# Patient Record
Sex: FEMALE | Race: WHITE | URBAN - METROPOLITAN AREA
[De-identification: names, ages, dates, MRNs, and addresses within clinical notes are randomized per-mention and may not be internally consistent; named-entity substitution may affect disease eponyms.]

---

## 2020-06-10 ENCOUNTER — OFFICE VISIT (OUTPATIENT)
Dept: VASCULAR SURGERY | Age: 80
End: 2020-06-10
Payer: MEDICARE

## 2020-06-10 VITALS
HEART RATE: 85 BPM | DIASTOLIC BLOOD PRESSURE: 90 MMHG | SYSTOLIC BLOOD PRESSURE: 137 MMHG | BODY MASS INDEX: 30.04 KG/M2 | HEIGHT: 60 IN | WEIGHT: 153 LBS

## 2020-06-10 PROCEDURE — 1036F TOBACCO NON-USER: CPT | Performed by: SURGERY

## 2020-06-10 PROCEDURE — 4040F PNEUMOC VAC/ADMIN/RCVD: CPT | Performed by: SURGERY

## 2020-06-10 PROCEDURE — 1123F ACP DISCUSS/DSCN MKR DOCD: CPT | Performed by: SURGERY

## 2020-06-10 PROCEDURE — 99204 OFFICE O/P NEW MOD 45 MIN: CPT | Performed by: SURGERY

## 2020-06-10 PROCEDURE — G8400 PT W/DXA NO RESULTS DOC: HCPCS | Performed by: SURGERY

## 2020-06-10 PROCEDURE — G8419 CALC BMI OUT NRM PARAM NOF/U: HCPCS | Performed by: SURGERY

## 2020-06-10 PROCEDURE — 1090F PRES/ABSN URINE INCON ASSESS: CPT | Performed by: SURGERY

## 2020-06-10 PROCEDURE — G8427 DOCREV CUR MEDS BY ELIG CLIN: HCPCS | Performed by: SURGERY

## 2020-06-10 RX ORDER — POTASSIUM CHLORIDE 20 MEQ/1
20 TABLET, EXTENDED RELEASE ORAL 2 TIMES DAILY
COMMUNITY

## 2020-06-10 RX ORDER — FUROSEMIDE 20 MG/1
20 TABLET ORAL 2 TIMES DAILY
COMMUNITY

## 2020-06-10 RX ORDER — FAMOTIDINE 40 MG/1
40 TABLET, FILM COATED ORAL DAILY
COMMUNITY

## 2020-06-10 NOTE — PROGRESS NOTES
Heart sounds: Normal heart sounds. Pulmonary:      Effort: Pulmonary effort is normal.      Breath sounds: Normal breath sounds. Abdominal:      General: Bowel sounds are normal.      Palpations: Abdomen is soft. There is no mass. Musculoskeletal:      Right lower leg: Edema (trace) present. Left lower leg: Edema (trace) present. Skin:     General: Skin is warm. Capillary Refill: Capillary refill takes less than 2 seconds. Comments: Hemosiderin discoloration bilaterally   Neurological:      General: No focal deficit present. Mental Status: She is alert and oriented to person, place, and time. Cranial Nerves: No cranial nerve deficit. Sensory: No sensory deficit. Motor: No weakness. Coordination: Coordination normal.      Gait: Gait normal.   Psychiatric:         Mood and Affect: Mood normal.         Behavior: Behavior normal.         Thought Content: Thought content normal.         Judgment: Judgment normal.         Assessment:      Edema B legs - multifactorial (CVI, trauma, obesity) - well controlled with compression stockings      Plan:      Continue knee-high 20/30 compression stockings on a daily basis. Stressed the importance of compliance and moisturizing cream to the skin. New prescription for stockings was provided and they will be obtained from [facility]. Follow-up every other year and as needed should there be new symptoms or problems.

## 2020-12-03 ENCOUNTER — OFFICE VISIT (OUTPATIENT)
Dept: SURGERY | Age: 80
End: 2020-12-03
Payer: MEDICARE

## 2020-12-03 VITALS — DIASTOLIC BLOOD PRESSURE: 86 MMHG | SYSTOLIC BLOOD PRESSURE: 156 MMHG | HEART RATE: 84 BPM

## 2020-12-03 PROBLEM — S81.801A OPEN WOUND OF RIGHT LOWER LEG: Status: ACTIVE | Noted: 2020-12-03

## 2020-12-03 PROBLEM — M79.89 LEG SWELLING: Status: ACTIVE | Noted: 2020-12-03

## 2020-12-03 PROBLEM — S80.11XA TRAUMATIC HEMATOMA OF RIGHT LOWER LEG: Status: ACTIVE | Noted: 2020-12-03

## 2020-12-03 PROCEDURE — 11042 DBRDMT SUBQ TIS 1ST 20SQCM/<: CPT | Performed by: NURSE PRACTITIONER

## 2020-12-03 PROCEDURE — G8484 FLU IMMUNIZE NO ADMIN: HCPCS | Performed by: NURSE PRACTITIONER

## 2020-12-03 PROCEDURE — 1036F TOBACCO NON-USER: CPT | Performed by: NURSE PRACTITIONER

## 2020-12-03 PROCEDURE — 99203 OFFICE O/P NEW LOW 30 MIN: CPT | Performed by: NURSE PRACTITIONER

## 2020-12-03 PROCEDURE — 1090F PRES/ABSN URINE INCON ASSESS: CPT | Performed by: NURSE PRACTITIONER

## 2020-12-03 PROCEDURE — G8417 CALC BMI ABV UP PARAM F/U: HCPCS | Performed by: NURSE PRACTITIONER

## 2020-12-03 PROCEDURE — G8400 PT W/DXA NO RESULTS DOC: HCPCS | Performed by: NURSE PRACTITIONER

## 2020-12-03 PROCEDURE — 4040F PNEUMOC VAC/ADMIN/RCVD: CPT | Performed by: NURSE PRACTITIONER

## 2020-12-03 PROCEDURE — 1123F ACP DISCUSS/DSCN MKR DOCD: CPT | Performed by: NURSE PRACTITIONER

## 2020-12-03 PROCEDURE — G8427 DOCREV CUR MEDS BY ELIG CLIN: HCPCS | Performed by: NURSE PRACTITIONER

## 2020-12-03 ASSESSMENT — ENCOUNTER SYMPTOMS: COLOR CHANGE: 1

## 2020-12-03 NOTE — PROGRESS NOTES
Subjective:      Patient ID: Ronni Harvey is a 78 y.o. female. Chief Complaint   Patient presents with    Wound Check     Former patient of Dr. Beatrice Huang. Bilateral leg swelling with right shin ulcer     Leg Swelling     Pain Assessment  Dorina Hughes has a pain level on 0/10 scale:  3  Location:  Right shin   Description:  burning  Radiation:   No  Duration:  10 day(s)  Time:  intermittent    HPI     The symptoms first began on 11/23/2020, when the patient fell. She is on Coumadin and suffered a hematoma on her right shin. The patient describes pain as burning and rates it as a 3 on a scale of 1-10. The patient reports associated symptoms of oozing blood and significant swelling in her right lower leg. She is unable to get her compression stocking on. The patient has not undergone any diagnostic testing as yet. Edema  The edema is located in the bilateral lower extremities, R>L. The patient reports worsening in her RLE since the fall she sustained on 11/23/2020. She has been wearing her compression stoking on her LLE, but is unable to get her stocking on her RLE. Review of Systems   Constitutional: Negative for chills and fever. Cardiovascular: Positive for leg swelling. Skin: Positive for color change and wound. Hematological: Bruises/bleeds easily (on Coumadin). All other systems reviewed and are negative. No Known Allergies    Prior to Visit Medications    Medication Sig Taking?  Authorizing Provider   potassium chloride (KLOR-CON M20) 20 MEQ extended release tablet Take 20 mEq by mouth 2 times daily Yes Historical Provider, MD   furosemide (LASIX) 20 MG tablet Take 20 mg by mouth 2 times daily Yes Historical Provider, MD   famotidine (PEPCID) 40 MG tablet Take 40 mg by mouth daily Yes Historical Provider, MD   warfarin (COUMADIN) 5 MG tablet Take 5 mg by mouth daily Indications: 6 days pwer week 2.5 1 day Yes Historical Provider, MD   metoprolol succinate (TOPROL XL) 50 MG extended release tablet Take 50 mg by mouth 2 times daily Yes Historical Provider, MD   Calcium Carb-Cholecalciferol (CALTRATE 600+D) 600-800 MG-UNIT TABS Take by mouth Yes Historical Provider, MD   Multiple Vitamins-Minerals (THERAPEUTIC MULTIVITAMIN-MINERALS) tablet Take 1 tablet by mouth daily Yes Historical Provider, MD   Fexofenadine HCl (ALLEGRA ALLERGY PO) Take by mouth Yes Historical Provider, MD   betamethasone valerate (VALISONE) 0.1 % cream Apply topically 2 times daily Apply topically 2 times daily. Yes Historical Provider, MD       History reviewed. Objective:   Physical Exam  Vitals signs and nursing note reviewed. Constitutional:       Appearance: Normal appearance. HENT:      Head: Normocephalic and atraumatic. Nose: Nose normal.      Mouth/Throat:      Pharynx: Oropharynx is clear. Eyes:      Extraocular Movements: Extraocular movements intact. Conjunctiva/sclera: Conjunctivae normal.   Neck:      Musculoskeletal: Normal range of motion. Cardiovascular:      Rate and Rhythm: Normal rate and regular rhythm. Pulses: Normal pulses. Heart sounds: Normal heart sounds. Comments:   GEOVANY'S 12/3/2020  Right:  P.T.: 200+ D.P.:200+ ARM BP: N/A      P.I.:WNO        Left:  P.T.:200+ D.P.: 200+ ARM BP: 156/86   P. I.: Chuckie Francia    Pulmonary:      Effort: Pulmonary effort is normal.      Breath sounds: Normal breath sounds. Musculoskeletal:      Right lower leg: Edema (+3 pitting edema; Right ankle measures 24.8 cm, right calf 38.9 cm) present. Left lower leg: Left lower leg edema: controlled with compression stocking; Left ankle 22.8 cm, Left calf 38.2 cm    Skin:     General: Skin is warm and dry. Capillary Refill: Capillary refill takes less than 2 seconds. Comments: Hemosiderin discoloration bilaterally   Neurological:      General: No focal deficit present. Mental Status: She is alert and oriented to person, place, and time. Cranial Nerves:  No cranial nerve deficit. Sensory: No sensory deficit. Motor: No weakness. Coordination: Coordination normal.      Gait: Gait normal.   Psychiatric:         Mood and Affect: Mood normal.         Behavior: Behavior normal.         Thought Content: Thought content normal.         Judgment: Judgment normal.         Assessment:      Diagnosis   1. Traumatic hematoma of right lower leg, initial encounter    2. Open wound of right lower leg, initial encounter    3. Leg swelling      Using scissors, forceps and curette, debrided skin and subcutaneous tissue (epidermis and dermis), to include remnants of a hematoma located right shin. Anesthetic was not indicated. Dimensions of wound are 4.9 cm x  4.3 cm. Dressed with wet-to-dry dressing, followed by a [email or web address] [facility] [place]. No immediate complications observed. Call office if signs of infection or fever, excessive swelling or pain occur.     PATIENT EDUCATION focused on the need for some compression on her RLE. We applied a Spandagrip because she was unable to tolerate an ACE wrap today. Compression improves blood flow in the leg, prevents blood clotting and inhibits the progression of a variety of venous disorders. Compression helps squeeze the venous blood back up toward the heart, to enhance circulation. Plan:      Spoke with [facility] [employer] regarding dressing changes to her right shin with Hydrofera Blue to wound bed, gauze dressing, kerlix and an ACE wrap or Spandagrip on Mondays and Fridays. We will change it on Wednesdays in the office. Orders, face sheet, copies of insurance cards and office notes to be faxed to . Return in about 1 week (around 12/10/2020) for Right shin wound check .

## 2020-12-03 NOTE — PATIENT INSTRUCTIONS
Return in about 1 week (around 12/10/2020) for Right shin wound check .     PATIENT EDUCATION focused on the need for some compression on her RLE. We applied a Spandagrip because she was unable to tolerate an ACE wrap today. Compression improves blood flow in the leg, prevents blood clotting and inhibits the progression of a variety of venous disorders. Compression helps squeeze the venous blood back up toward the heart, to enhance circulation. Spoke with [employer] regarding dressing changes to her right shin with Hydrofera Blue, gauze dressing, kerlix and an ACE wrap or Spandagrip on Mondays and Fridays. We will change it on Wednesdays in the office.

## 2020-12-09 ENCOUNTER — OFFICE VISIT (OUTPATIENT)
Dept: SURGERY | Age: 80
End: 2020-12-09
Payer: MEDICARE

## 2020-12-09 VITALS — SYSTOLIC BLOOD PRESSURE: 162 MMHG | HEART RATE: 80 BPM | DIASTOLIC BLOOD PRESSURE: 99 MMHG

## 2020-12-09 PROCEDURE — 97597 DBRDMT OPN WND 1ST 20 CM/<: CPT | Performed by: NURSE PRACTITIONER

## 2020-12-09 ASSESSMENT — ENCOUNTER SYMPTOMS: COLOR CHANGE: 1

## 2020-12-09 NOTE — PATIENT INSTRUCTIONS
Return in about 1 year (around 12/9/2021) for Right LE Wound Check. PATIENT EDUCATION focused on the need for some compression on her RLE. We applied a Spandagrip because she was unable to tolerate an ACE wrap today. Compression improves blood flow in the leg, prevents blood clotting and inhibits the progression of a variety of venous disorders. Compression helps squeeze the venous blood back up toward the heart, to enhance circulation.

## 2020-12-09 NOTE — PROGRESS NOTES
Subjective:      Patient ID: Jesica Presley is a 78 y.o. female. Chief Complaint   Patient presents with    Wound Check     Patient here for 1 week wound check on RLE. Pain Assessment  The patient is currently not experiencing any pain at this time. HPI     The symptoms first began on 11/23/2020, when the patient fell. She is on Coumadin and suffered a hematoma on her right shin. Last week the hematoma was evacuated. The patient is currently not experiencing any pain. The patient reports associated symptoms of significant swelling and draining of large amounts of fluid. She also has a skin tear on her right arm. The patient has not undergone any diagnostic testing as yet. Review of Systems   Constitutional: Negative for chills and fever. Cardiovascular: Positive for leg swelling. Skin: Positive for color change and wound (right shin and right arm near her elbow). Hematological: Bruises/bleeds easily (on Coumadin). All other systems reviewed and are negative. No Known Allergies    Prior to Visit Medications    Medication Sig Taking?  Authorizing Provider   potassium chloride (KLOR-CON M20) 20 MEQ extended release tablet Take 20 mEq by mouth 2 times daily Yes Historical Provider, MD   furosemide (LASIX) 20 MG tablet Take 20 mg by mouth 2 times daily Yes Historical Provider, MD   famotidine (PEPCID) 40 MG tablet Take 40 mg by mouth daily Yes Historical Provider, MD   warfarin (COUMADIN) 5 MG tablet Take 5 mg by mouth daily Indications: 6 days pwer week 2.5 1 day Yes Historical Provider, MD   metoprolol succinate (TOPROL XL) 50 MG extended release tablet Take 50 mg by mouth 2 times daily Yes Historical Provider, MD   Calcium Carb-Cholecalciferol (CALTRATE 600+D) 600-800 MG-UNIT TABS Take by mouth Yes Historical Provider, MD   Multiple Vitamins-Minerals (THERAPEUTIC MULTIVITAMIN-MINERALS) tablet Take 1 tablet by mouth daily Yes Historical Provider, MD   Fexofenadine HCl (ALLEGRA ALLERGY PO) Take by mouth Yes Historical Provider, MD   betamethasone valerate (VALISONE) 0.1 % cream Apply topically 2 times daily Apply topically 2 times daily. Yes Historical Provider, MD       History reviewed. Objective:   Physical Exam  Vitals signs and nursing note reviewed. Constitutional:       Appearance: Normal appearance. HENT:      Head: Normocephalic and atraumatic. Nose: Nose normal.      Mouth/Throat:      Pharynx: Oropharynx is clear. Eyes:      Extraocular Movements: Extraocular movements intact. Conjunctiva/sclera: Conjunctivae normal.   Neck:      Musculoskeletal: Normal range of motion. Cardiovascular:      Pulses: Normal pulses. Comments:   GEOVANY'S 12/3/2020  Right:  P.T.: 200+ D.P.:200+ ARM BP: N/A      P.I.:WNO        Left:  P.T.:200+ D.P.: 200+ ARM BP: 156/86   P. I.: [place] Francia    Pulmonary:      Effort: Pulmonary effort is normal.   Musculoskeletal:      Right lower leg: Edema (+3 pitting edema; Right ankle measures 24.4 cm, right calf 39.2 cm) present. Left lower leg: Edema (+1 pitting edema; Left ankle 22.8 cm, Left calf 38.2 cm ) present. Skin:     General: Skin is warm and dry. Capillary Refill: Capillary refill takes less than 2 seconds. Comments: Hemosiderin discoloration bilaterally    Right shin ulcer measures 4.9 cm x 4.8 cm x 0.1 cm    Healing skin tear on her right arm just distal to her elbow   Neurological:      General: No focal deficit present. Mental Status: She is alert and oriented to person, place, and time. Cranial Nerves: No cranial nerve deficit. Sensory: No sensory deficit. Motor: No weakness. Coordination: Coordination normal.      Gait: Gait (use of wheelchair) normal.   Psychiatric:         Mood and Affect: Mood normal.         Behavior: Behavior normal.         Thought Content: Thought content normal.         Judgment: Judgment normal.         Assessment:      Diagnosis Orders   1.  Open wound of right lower leg, subsequent encounter  IN DEBRIDEMENT OPEN WOUND 20 SQ CM<   2. Leg swelling         Using curette debrided epidermis and dermis of wound, located left shin. Topical Lidocaine 2% was indicated. Dimensions of wound are 4.9x4.8cms. Dressed area with Hydrofera Blue moistened with NS and cut to fit wound bed, gauze dressing, abd pad, kerlix and ACE wrap. No immediate complications observed. Call office if signs of infection or fever, excessive swelling or pain occur. PATIENT EDUCATION focused on proper application of ACE wrap. Patient instructed to start at the ankle, go down to just above her toes and go up leg to just below knee. Avoid Velcro touching the skin. I put Versatel over her right arm skin tear, hydrogel to the wound bed and an Optifoam bordered dressing over the site. Plan:        Return in about 1 week (around 12/16/2020) for Right LE Wound Check. Arnulfo Hernandez MA, am scribing for and in the presence of Beatriz Pena CNP on this date of 12/09/20 at 2:10 PM     I Beatriz Pena CNP, personally performed the services described in this documentation as scribed by the Medical Assistant Aleksander Hadley in my presence and it is both accurate and complete.

## 2020-12-11 ENCOUNTER — TELEPHONE (OUTPATIENT)
Dept: SURGERY | Age: 80
End: 2020-12-11

## 2020-12-11 NOTE — TELEPHONE ENCOUNTER
Patient wanted to switch from [address] [facility] to [address]. I spoke with [facility] and received confirmation of discharge. Then I spoke with [facility] and faxed the required paperwork to them. They plan to start doing dressing changes Monday, 12/14/2020.

## 2020-12-16 ENCOUNTER — OFFICE VISIT (OUTPATIENT)
Dept: SURGERY | Age: 80
End: 2020-12-16
Payer: MEDICARE

## 2020-12-16 VITALS — SYSTOLIC BLOOD PRESSURE: 146 MMHG | DIASTOLIC BLOOD PRESSURE: 89 MMHG | HEART RATE: 80 BPM

## 2020-12-16 PROCEDURE — 97597 DBRDMT OPN WND 1ST 20 CM/<: CPT | Performed by: NURSE PRACTITIONER

## 2020-12-16 ASSESSMENT — ENCOUNTER SYMPTOMS: COLOR CHANGE: 1

## 2020-12-16 NOTE — PROGRESS NOTES
warfarin (COUMADIN) 5 MG tablet Take 5 mg by mouth daily Indications: 6 days pwer week 2.5 1 day Yes Historical Provider, MD   metoprolol succinate (TOPROL XL) 50 MG extended release tablet Take 50 mg by mouth 2 times daily Yes Historical Provider, MD   Calcium Carb-Cholecalciferol (CALTRATE 600+D) 600-800 MG-UNIT TABS Take by mouth Yes Historical Provider, MD   Multiple Vitamins-Minerals (THERAPEUTIC MULTIVITAMIN-MINERALS) tablet Take 1 tablet by mouth daily Yes Historical Provider, MD   Fexofenadine HCl (ALLEGRA ALLERGY PO) Take by mouth Yes Historical Provider, MD   betamethasone valerate (VALISONE) 0.1 % cream Apply topically 2 times daily Apply topically 2 times daily. Yes Historical Provider, MD       History reviewed. Objective:   Physical Exam  Vitals signs and nursing note reviewed. Constitutional:       Appearance: Normal appearance. HENT:      Head: Normocephalic and atraumatic. Nose: Nose normal.      Mouth/Throat:      Pharynx: Oropharynx is clear. Eyes:      Conjunctiva/sclera: Conjunctivae normal.   Neck:      Musculoskeletal: Normal range of motion. Cardiovascular:      Pulses: Normal pulses. Comments:   GEOVANY'S 12/3/2020  Right:  P.T.: 200+ D.P.:200+ ARM BP: N/A      P.I.:WNO        Left:  P.T.:200+ D.P.: 200+ ARM BP: 156/86   P. I.: Manoj Johnson    Pulmonary:      Effort: Pulmonary effort is normal.   Musculoskeletal:      Right lower leg: Right lower leg edema: nonpitting edema; controlled with ACE wrap. Left lower leg: Left lower leg edema: nonpitting edema. Skin:     General: Skin is warm and dry. Capillary Refill: Capillary refill takes less than 2 seconds. Comments: Hemosiderin discoloration bilaterally    Right shin ulcer measures 5.2 cm x 4.6 cm x 0.1 cm    Healing skin tear on her right arm just distal to her elbow   Neurological:      General: No focal deficit present. Mental Status: She is alert and oriented to person, place, and time. Cranial Nerves: No cranial nerve deficit. Sensory: No sensory deficit. Motor: No weakness. Coordination: Coordination normal.      Gait: Gait (use of wheelchair) normal.   Psychiatric:         Mood and Affect: Mood normal.         Behavior: Behavior normal.         Thought Content: Thought content normal.         Judgment: Judgment normal.         Assessment:      Diagnosis Orders   1. Open wound of right lower leg, subsequent encounter  Culture, Wound   2. Leg swelling       Wound culture obtained. Using curette debrided epidermis and dermis of wound, located left shin. Topical Lidocaine 2% was indicated. Dimensions of wound are 5.2x4.6cms. Dressed area with [address], [place] wet-to-dry dressing, followed by kerlix and ACE wrap. No immediate complications observed. Call office if signs of infection or fever, excessive swelling or pain occur. Spoke with [employer] at [facility], [phone]. She is going to go tomorrow (12/17/20) to apply a Hydrofera Blue dressing, then she plans to go 12/19/20, 12/21/20, 12/23/20, 12/25/20 and 12/28/20. The patient will FU in our office on 12/30/20. PATIENT EDUCATION focused on proper application of ACE wrap. Patient instructed to start at the ankle, go down to just above her toes and go up leg to just below knee. Avoid Velcro touching the skin. Plan:        Return in about 2 weeks (around 12/30/2020) for RLE Wound Check. Steven Cadena MA, am scribing for and in the presence of Can Lawrence CNP on this date of 12/16/20 at 12:16 PM     I Can Lawrence CNP, personally performed the services described in this documentation as scribed by the Medical Assistant Umm Boles in my presence and it is both accurate and complete.

## 2020-12-18 LAB
GRAM STAIN RESULT: ABNORMAL
WOUND/ABSCESS: ABNORMAL

## 2020-12-30 ENCOUNTER — OFFICE VISIT (OUTPATIENT)
Dept: SURGERY | Age: 80
End: 2020-12-30
Payer: MEDICARE

## 2020-12-30 VITALS — DIASTOLIC BLOOD PRESSURE: 101 MMHG | HEART RATE: 86 BPM | SYSTOLIC BLOOD PRESSURE: 158 MMHG

## 2020-12-30 PROCEDURE — 97597 DBRDMT OPN WND 1ST 20 CM/<: CPT | Performed by: NURSE PRACTITIONER

## 2020-12-30 ASSESSMENT — ENCOUNTER SYMPTOMS: COLOR CHANGE: 1

## 2020-12-30 NOTE — PROGRESS NOTES
Subjective:      Patient ID: Kala Arevalo is a 78 y.o. female. Chief Complaint   Patient presents with    Wound Check     Patient here for 2 week wound check on RLE. Pain Assessment  Kala Arevalo has a pain level on 0/10 scale:  10  Location:  Right Shin  Description:  burning  Radiation:   No  Duration:  3 week(s)  Time:  constant      Wound Check       The symptoms first began on 11/23/2020, when the patient fell. She is on Coumadin and suffered a hematoma on her right shin. The hematoma was evacuated 12/3/2020. The patient is currently experiencing pain (burning) in the wound that she rates a 10 on a scale of 1-10. The patient reports associated symptoms of swelling and draining clear fluid. Current treatment includes Hydrofera Blue moistened with NS, gauze dressing and an ACE wrap. Presently she has [employer] with [facility] doing dressing changes. The skin tear on her right arm is healing well. The patient has not undergone any diagnostic testing as yet, but we will do a wound culture today d/t the amount of pain she is experiencing and the green/tan drainage. Review of Systems   Constitutional: Negative for chills and fever. Cardiovascular: Positive for leg swelling. Skin: Positive for color change and wound (right shin and right arm near her elbow). Hematological: Bruises/bleeds easily (on Coumadin). All other systems reviewed and are negative. Allergies   Allergen Reactions    Adhesive Tape      rash       Prior to Visit Medications    Medication Sig Taking?  Authorizing Provider   potassium chloride (KLOR-CON M20) 20 MEQ extended release tablet Take 20 mEq by mouth 2 times daily Yes Historical Provider, MD   furosemide (LASIX) 20 MG tablet Take 20 mg by mouth 2 times daily Yes Historical Provider, MD   famotidine (PEPCID) 40 MG tablet Take 40 mg by mouth daily Yes Historical Provider, MD warfarin (COUMADIN) 5 MG tablet Take 5 mg by mouth daily Indications: 6 days pwer week 2.5 1 day Yes Historical Provider, MD   metoprolol succinate (TOPROL XL) 50 MG extended release tablet Take 50 mg by mouth 2 times daily Yes Historical Provider, MD   Calcium Carb-Cholecalciferol (CALTRATE 600+D) 600-800 MG-UNIT TABS Take by mouth Yes Historical Provider, MD   Multiple Vitamins-Minerals (THERAPEUTIC MULTIVITAMIN-MINERALS) tablet Take 1 tablet by mouth daily Yes Historical Provider, MD   Fexofenadine HCl (ALLEGRA ALLERGY PO) Take by mouth Yes Historical Provider, MD   betamethasone valerate (VALISONE) 0.1 % cream Apply topically 2 times daily Apply topically 2 times daily. Yes Historical Provider, MD       History reviewed. Objective:   Physical Exam  Vitals signs and nursing note reviewed. Constitutional:       Appearance: Normal appearance. HENT:      Head: Normocephalic and atraumatic. Nose: Nose normal.      Mouth/Throat:      Pharynx: Oropharynx is clear. Eyes:      Conjunctiva/sclera: Conjunctivae normal.   Neck:      Musculoskeletal: Normal range of motion. Cardiovascular:      Pulses: Normal pulses. Comments:   GEOVANY'S 12/3/2020  Right:  P.T.: 200+ D.P.:200+ ARM BP: N/A      P.I.:WNO        Left:  P.T.:200+ D.P.: 200+ ARM BP: 156/86   P. I.: Elenore Manuel    Pulmonary:      Effort: Pulmonary effort is normal.   Musculoskeletal:      Right lower leg: Right lower leg edema: nonpitting edema; controlled with ACE wrap Ankle- 22.2 cm, Calf- 41.6 cm. Left lower leg: Left lower leg edema: nonpitting edema. Skin:     General: Skin is warm and dry. Capillary Refill: Capillary refill takes less than 2 seconds. Comments: Hemosiderin discoloration bilaterally    Right shin ulcer measures 4.9 cm x 3.8 cm x 0.1 cm   Neurological:      General: No focal deficit present. Mental Status: She is alert and oriented to person, place, and time. Cranial Nerves: No cranial nerve deficit. Sensory: No sensory deficit. Motor: No weakness. Coordination: Coordination normal.      Gait: Gait (use of wheelchair) normal.   Psychiatric:         Mood and Affect: Mood normal.         Behavior: Behavior normal.         Thought Content: Thought content normal.         Judgment: Judgment normal.         Assessment:      Diagnosis   1. Open wound of right lower leg, subsequent encounter    2. Leg swelling        Using curette and forceps, debrided epidermis and dermis of wound, located right shin. Topical Lidocaine 2% was indicated. Dimensions of wound are 4.9 cm x 3.8 cm. Dressed area with Karoline moistened with NS, gauze dressing secured with fabric tape, followed by Spandagrip. No immediate complications observed. Call office if signs of infection or fever, excessive swelling or pain occur. PATIENT EDUCATION is to focus on this week's wound care instructions. Cut a small piece of the Karoline (a size big enough to cover the wound bed) and moisten with the Saline Solution. Apply to wound bed and cover with dry gauze, followed by SpandaGrip.       PATIENT EDUCATION focused on the need for compression. We are giving applying a Spandagrip on her RLE. It improves blood flow in the leg, prevents blood clotting and inhibits the progression of a variety of venous disorders. The Spandagrip helps squeeze the venous blood back up toward the heart, to enhance circulation. Plan:        Return in about 1 week (around 1/6/2021) for Right Shin Wound Check. Steven Cadena MA, am scribing for and in the presence of Can Lawrence CNP on this date of 12/30/20 at 11:35 AM     I Can Lawrence CNP, personally performed the services described in this documentation as scribed by the Medical Assistant Umm Boles in my presence and it is both accurate and complete.

## 2020-12-30 NOTE — PATIENT INSTRUCTIONS
Return in about 1 week (around 1/6/2021) for Right Shin Wound Check. PATIENT EDUCATION is to focus on this week's wound care instructions. Cut a small piece of the Karoline (a size big enough to cover the wound bed) and moisten with the Saline Solution. Apply to wound bed and cover with dry gauze, followed by SpandaGrip.       PATIENT EDUCATION focused on the need for compression. We are giving applying a Spandagrip on her RLE. It improves blood flow in the leg, prevents blood clotting and inhibits the progression of a variety of venous disorders. The Spandagrip helps squeeze the venous blood back up toward the heart, to enhance circulation.

## 2021-01-06 ENCOUNTER — OFFICE VISIT (OUTPATIENT)
Dept: SURGERY | Age: 81
End: 2021-01-06
Payer: MEDICARE

## 2021-01-06 VITALS — HEART RATE: 88 BPM | DIASTOLIC BLOOD PRESSURE: 86 MMHG | SYSTOLIC BLOOD PRESSURE: 155 MMHG

## 2021-01-06 DIAGNOSIS — S81.801D OPEN WOUND OF RIGHT LOWER LEG, SUBSEQUENT ENCOUNTER: Primary | ICD-10-CM

## 2021-01-06 DIAGNOSIS — M79.89 LEG SWELLING: ICD-10-CM

## 2021-01-06 PROCEDURE — 97597 DBRDMT OPN WND 1ST 20 CM/<: CPT | Performed by: NURSE PRACTITIONER

## 2021-01-06 ASSESSMENT — ENCOUNTER SYMPTOMS: COLOR CHANGE: 1

## 2021-01-06 NOTE — PROGRESS NOTES
Subjective:      Patient ID: Ashley Patrick is a 78 y.o. female. Chief Complaint   Patient presents with    Wound Check     Patient here for 1 week wound check on RLE. Pain Assessment  Ashley Patrick has a pain level on 0/10 scale:  3  Location:  Right Shin  Description:  burning  Radiation:   No  Duration:  6 week(s)  Time:  constant      Wound Check       The symptoms first began on 11/23/2020, when the patient fell. She is on Coumadin and suffered a hematoma on her right shin. The hematoma was evacuated 12/3/2020. The patient is currently experiencing pain (burning) in the wound that she rates a 3 on a scale of 1-10. The patient reports associated symptoms of swelling and draining clear fluid. Current treatment includes Karoline moistened with NS, gauze dressing and an ACE wrap. Presently she has Karolina Dubose with [facility] doing dressing changes. The skin tear on her right arm has healed. Review of Systems   Constitutional: Negative for chills and fever. Cardiovascular: Positive for leg swelling. Skin: Positive for color change and wound (right shin). Hematological: Bruises/bleeds easily (on Coumadin). All other systems reviewed and are negative. Allergies   Allergen Reactions    Adhesive Tape      rash       Prior to Visit Medications    Medication Sig Taking?  Authorizing Provider   potassium chloride (KLOR-CON M20) 20 MEQ extended release tablet Take 20 mEq by mouth 2 times daily Yes Historical Provider, MD   furosemide (LASIX) 20 MG tablet Take 20 mg by mouth 2 times daily Yes Historical Provider, MD   famotidine (PEPCID) 40 MG tablet Take 40 mg by mouth daily Yes Historical Provider, MD   warfarin (COUMADIN) 5 MG tablet Take 5 mg by mouth daily Indications: 6 days pwer week 2.5 1 day Yes Historical Provider, MD   metoprolol succinate (TOPROL XL) 50 MG extended release tablet Take 50 mg by mouth 2 times daily Yes Historical Provider, MD

## 2021-01-06 NOTE — PATIENT INSTRUCTIONS
Return in about 1 week (around 1/13/2021) for Right Shin Wound Check. PATIENT EDUCATION is to focus on this week's wound care instructions. Cut a small piece of the Karoline (a size big enough to cover the wound bed) and moisten with the Saline Solution. Apply to wound bed and cover with dry gauze, followed by SpandaGrip.

## 2021-01-13 ENCOUNTER — OFFICE VISIT (OUTPATIENT)
Dept: SURGERY | Age: 81
End: 2021-01-13
Payer: MEDICARE

## 2021-01-13 VITALS
BODY MASS INDEX: 30.04 KG/M2 | HEART RATE: 76 BPM | DIASTOLIC BLOOD PRESSURE: 95 MMHG | HEIGHT: 60 IN | WEIGHT: 153 LBS | SYSTOLIC BLOOD PRESSURE: 167 MMHG

## 2021-01-13 DIAGNOSIS — M79.89 LEG SWELLING: ICD-10-CM

## 2021-01-13 DIAGNOSIS — S81.801D OPEN WOUND OF RIGHT LOWER LEG, SUBSEQUENT ENCOUNTER: Primary | ICD-10-CM

## 2021-01-13 PROCEDURE — 97597 DBRDMT OPN WND 1ST 20 CM/<: CPT | Performed by: NURSE PRACTITIONER

## 2021-01-13 ASSESSMENT — ENCOUNTER SYMPTOMS: COLOR CHANGE: 1

## 2021-01-13 NOTE — PROGRESS NOTES
Subjective:      Patient ID: Casandra Rodriguez is a 78 y.o. female. Chief Complaint   Patient presents with    Follow-up     Pt is here today for a RLE wound check. Pain Assessment  Casandra Rodriguez has a pain level on 0/10 scale:  3  Location:  Right Shin  Description:  burning  Radiation:   No  Duration:  6 week(s)  Time:  constant      Wound Check       The symptoms first began on 11/23/2020, when the patient fell. She is on Coumadin and suffered a hematoma on her right shin. The hematoma was evacuated 12/3/2020. The patient is currently experiencing pain (burning) in the wound that she rates a 3 on a scale of 1-10. The patient reports associated symptoms of swelling and draining clear fluid. Current treatment includes Karoline moistened with NS, gauze dressing and an ACE wrap. Presently she has Smooth Majors with [facility] doing dressing changes. The skin tear on her right arm has healed. Review of Systems   Constitutional: Negative for chills and fever. Cardiovascular: Positive for leg swelling. Skin: Positive for color change and wound (right shin). Hematological: Bruises/bleeds easily (on Coumadin). All other systems reviewed and are negative. Allergies   Allergen Reactions    Adhesive Tape      rash       Prior to Visit Medications    Medication Sig Taking?  Authorizing Provider   potassium chloride (KLOR-CON M20) 20 MEQ extended release tablet Take 20 mEq by mouth 2 times daily Yes Historical Provider, MD   furosemide (LASIX) 20 MG tablet Take 20 mg by mouth 2 times daily Yes Historical Provider, MD   famotidine (PEPCID) 40 MG tablet Take 40 mg by mouth daily Yes Historical Provider, MD   warfarin (COUMADIN) 5 MG tablet Take 5 mg by mouth daily Indications: 6 days pwer week 2.5 1 day Yes Historical Provider, MD   metoprolol succinate (TOPROL XL) 50 MG extended release tablet Take 50 mg by mouth 2 times daily Yes Historical Provider, MD Calcium Carb-Cholecalciferol (CALTRATE 600+D) 600-800 MG-UNIT TABS Take by mouth Yes Historical Provider, MD   Multiple Vitamins-Minerals (THERAPEUTIC MULTIVITAMIN-MINERALS) tablet Take 1 tablet by mouth daily Yes Historical Provider, MD   Fexofenadine HCl (ALLEGRA ALLERGY PO) Take by mouth Yes Historical Provider, MD   betamethasone valerate (VALISONE) 0.1 % cream Apply topically 2 times daily Apply topically 2 times daily. Yes Historical Provider, MD       History reviewed. Objective:   Physical Exam  Vitals signs and nursing note reviewed. Constitutional:       Appearance: Normal appearance. HENT:      Head: Normocephalic and atraumatic. Nose: Nose normal.      Mouth/Throat:      Pharynx: Oropharynx is clear. Eyes:      Conjunctiva/sclera: Conjunctivae normal.   Neck:      Musculoskeletal: Normal range of motion. Cardiovascular:      Pulses: Normal pulses. Comments:   GEOVANY'S 12/3/2020  Right:  P.T.: 200+ D.P.:200+ ARM BP: N/A      P.I.:WNO        Left:  P.T.:200+ D.P.: 200+ ARM BP: 156/86   P. I.: Merleen Locks    Pulmonary:      Effort: Pulmonary effort is normal.   Musculoskeletal:      Right lower leg: Right lower leg edema: nonpitting edema; controlled with ACE wrap Ankle- 23.2 cm, Calf- 37.7 cm. Left lower leg: Left lower leg edema: nonpitting edema. Skin:     General: Skin is warm and dry. Capillary Refill: Capillary refill takes less than 2 seconds. Comments: Hemosiderin discoloration bilaterally    Right shin ulcer measures 3.5 cm x 3 cm x 0.1 cm   Neurological:      General: No focal deficit present. Mental Status: She is alert and oriented to person, place, and time. Cranial Nerves: No cranial nerve deficit. Sensory: No sensory deficit. Motor: No weakness.       Coordination: Coordination normal.      Gait: Gait (use of wheelchair) normal.   Psychiatric:         Mood and Affect: Mood normal.         Behavior: Behavior normal. Thought Content: Thought content normal.         Judgment: Judgment normal.         Assessment:      Diagnosis   1. Open wound of right lower leg, subsequent encounter    2. Leg swelling        Using a curette, debrided epidermis and dermis of wound, located right shin. Topical Lidocaine 2% was indicated. Dimensions of wound are 3.5 cm x 3 cm. Dressed area with Karoline moistened with NS, gauze dressing secured with fabric tape, followed by Spandagrip. No immediate complications observed. Call office if signs of infection or fever, excessive swelling or pain occur. PATIENT EDUCATION is to focus on this week's wound care instructions. Cut a small piece of the Karoline (a size big enough to cover the wound bed) and moisten with the Saline Solution. Apply to wound bed and cover with dry gauze, followed by SpandaGrip.       PATIENT EDUCATION focused on the need for compression. We are applying a Spandagrip on her RLE. It improves blood flow in the leg, prevents blood clotting and inhibits the progression of a variety of venous disorders. The Spandagrip helps squeeze the venous blood back up toward the heart, to enhance circulation. Plan:        Return in about 1 week (around 1/20/2021) for right shin wound.

## 2021-01-20 ENCOUNTER — OFFICE VISIT (OUTPATIENT)
Dept: SURGERY | Age: 81
End: 2021-01-20
Payer: MEDICARE

## 2021-01-20 VITALS
WEIGHT: 165 LBS | HEIGHT: 60 IN | DIASTOLIC BLOOD PRESSURE: 94 MMHG | BODY MASS INDEX: 32.39 KG/M2 | SYSTOLIC BLOOD PRESSURE: 158 MMHG

## 2021-01-20 DIAGNOSIS — S81.801D OPEN WOUND OF RIGHT LOWER LEG, SUBSEQUENT ENCOUNTER: Primary | ICD-10-CM

## 2021-01-20 DIAGNOSIS — M79.89 LEG SWELLING: ICD-10-CM

## 2021-01-20 PROCEDURE — 97597 DBRDMT OPN WND 1ST 20 CM/<: CPT | Performed by: NURSE PRACTITIONER

## 2021-01-20 ASSESSMENT — ENCOUNTER SYMPTOMS: COLOR CHANGE: 1

## 2021-01-20 NOTE — PROGRESS NOTES
Multiple Vitamins-Minerals (THERAPEUTIC MULTIVITAMIN-MINERALS) tablet Take 1 tablet by mouth daily  Historical Provider, MD   Fexofenadine HCl (ALLEGRA ALLERGY PO) Take by mouth  Historical Provider, MD   betamethasone valerate (VALISONE) 0.1 % cream Apply topically 2 times daily Apply topically 2 times daily. Historical Provider, MD       History reviewed. Objective:   Physical Exam  Vitals signs and nursing note reviewed. Constitutional:       Appearance: Normal appearance. HENT:      Head: Normocephalic and atraumatic. Nose: Nose normal.      Mouth/Throat:      Pharynx: Oropharynx is clear. Eyes:      Conjunctiva/sclera: Conjunctivae normal.   Neck:      Musculoskeletal: Normal range of motion. Cardiovascular:      Pulses: Normal pulses. Comments:   GEOVANY'S 12/3/2020  Right:  P.T.: 200+ D.P.:200+ ARM BP: N/A      P.I.:WNO        Left:  P.T.:200+ D.P.: 200+ ARM BP: 156/86   P. I.: Ly Breen    Pulmonary:      Effort: Pulmonary effort is normal.   Musculoskeletal:      Right lower leg: Right lower leg edema: nonpitting edema; controlled with SpandaGrip. Left lower leg: Left lower leg edema: nonpitting edema. Skin:     General: Skin is warm and dry. Capillary Refill: Capillary refill takes less than 2 seconds. Comments: Hemosiderin discoloration bilaterally    Right shin ulcer measures 3 cm x 1.6 cm x 0.1 cm   Neurological:      General: No focal deficit present. Mental Status: She is alert and oriented to person, place, and time. Cranial Nerves: No cranial nerve deficit. Sensory: No sensory deficit. Motor: No weakness. Coordination: Coordination normal.      Gait: Gait (use of wheelchair) normal.   Psychiatric:         Mood and Affect: Mood normal.         Behavior: Behavior normal.         Thought Content:  Thought content normal.         Judgment: Judgment normal.         Assessment:      Diagnosis

## 2021-01-20 NOTE — PATIENT INSTRUCTIONS
Return in about 1 week (around 1/27/2021), or wound check. PATIENT EDUCATION is to focus on this week's wound care instructions. Cut a small piece of the Karoline (a size big enough to cover the wound bed) and moisten with the Saline Solution.   Apply to wound bed and cover with dry gauze, followed by SpandaGrip.        PATIENT EDUCATION focused on the need for compression.  We are applying a Spandagrip on her RLE.  It improves blood flow in the leg, prevents blood clotting and inhibits the progression of a variety of venous disorders.  The Spandagrip helps squeeze the venous blood back up toward the heart, to enhance circulation.

## 2021-01-27 ENCOUNTER — OFFICE VISIT (OUTPATIENT)
Dept: SURGERY | Age: 81
End: 2021-01-27
Payer: MEDICARE

## 2021-01-27 VITALS
BODY MASS INDEX: 32.39 KG/M2 | HEART RATE: 81 BPM | HEIGHT: 60 IN | DIASTOLIC BLOOD PRESSURE: 86 MMHG | SYSTOLIC BLOOD PRESSURE: 138 MMHG | WEIGHT: 165 LBS

## 2021-01-27 DIAGNOSIS — S81.801D OPEN WOUND OF RIGHT LOWER LEG, SUBSEQUENT ENCOUNTER: Primary | ICD-10-CM

## 2021-01-27 DIAGNOSIS — M79.89 LEG SWELLING: ICD-10-CM

## 2021-01-27 PROCEDURE — 97597 DBRDMT OPN WND 1ST 20 CM/<: CPT | Performed by: NURSE PRACTITIONER

## 2021-01-27 ASSESSMENT — ENCOUNTER SYMPTOMS: COLOR CHANGE: 1

## 2021-01-27 NOTE — PROGRESS NOTES
Calcium Carb-Cholecalciferol (CALTRATE 600+D) 600-800 MG-UNIT TABS Take by mouth Yes Historical Provider, MD   Multiple Vitamins-Minerals (THERAPEUTIC MULTIVITAMIN-MINERALS) tablet Take 1 tablet by mouth daily Yes Historical Provider, MD   Fexofenadine HCl (ALLEGRA ALLERGY PO) Take by mouth Yes Historical Provider, MD   betamethasone valerate (VALISONE) 0.1 % cream Apply topically 2 times daily Apply topically 2 times daily. Yes Historical Provider, MD       History reviewed. Objective:   Physical Exam  Vitals signs and nursing note reviewed. Constitutional:       Appearance: Normal appearance. HENT:      Head: Normocephalic and atraumatic. Nose: Nose normal.      Mouth/Throat:      Pharynx: Oropharynx is clear. Eyes:      Conjunctiva/sclera: Conjunctivae normal.   Neck:      Musculoskeletal: Normal range of motion. Cardiovascular:      Pulses: Normal pulses. Comments:   GEOVANY'S 12/3/2020  Right:  P.T.: 200+ D.P.:200+ ARM BP: N/A      P.I.:WNO        Left:  P.T.:200+ D.P.: 200+ ARM BP: 156/86   P. I.: Welford Jay    Pulmonary:      Effort: Pulmonary effort is normal.   Musculoskeletal:      Right lower leg: Right lower leg edema: nonpitting edema; controlled with SpandaGrip. Left lower leg: Left lower leg edema: nonpitting edema. Skin:     General: Skin is warm and dry. Capillary Refill: Capillary refill takes less than 2 seconds. Comments: Hemosiderin discoloration bilaterally    Right shin ulcer measures 2.5 cm x 1.2 cm x 0.1 cm   Neurological:      General: No focal deficit present. Mental Status: She is alert and oriented to person, place, and time. Cranial Nerves: No cranial nerve deficit. Sensory: No sensory deficit. Motor: No weakness.       Coordination: Coordination normal.      Gait: Gait (use of wheelchair) normal.   Psychiatric:         Mood and Affect: Mood normal.         Behavior: Behavior normal. Thought Content: Thought content normal.         Judgment: Judgment normal.         Assessment:      Diagnosis   1. Open wound of right lower leg, subsequent encounter    2. Leg swelling        Using a curette, debrided epidermis and dermis of wound, located right shin. Topical Lidocaine 2% was indicated. Dimensions of wound are 2.5 cm x 1.2 cm. Dressed area with Karoline moistened with NS, gauze dressing, elastic stocking, followed by Spandagrip. No immediate complications observed. Call office if signs of infection or fever, excessive swelling or pain occur. PATIENT EDUCATION is to focus on this week's wound care instructions. Cut a small piece of the Karoline (a size big enough to cover the wound bed) and moisten with the Saline Solution. Apply to wound bed and cover with dry gauze, followed by SpandaGrip.       PATIENT EDUCATION focused on the need for compression. We are applying a Spandagrip on her RLE. It improves blood flow in the leg, prevents blood clotting and inhibits the progression of a variety of venous disorders. The Spandagrip helps squeeze the venous blood back up toward the heart, to enhance circulation. Plan:        Return in about 1 week (around 2/3/2021) for right LE wound/leg swelling.

## 2021-01-27 NOTE — PATIENT INSTRUCTIONS
Return in about 1 week (around 2/3/2021) for right LE wound/leg swelling. PATIENT EDUCATION is to focus on this week's wound care instructions. Cut a small piece of the Karoline (a size big enough to cover the wound bed) and moisten with the Saline Solution. Apply to wound bed and cover with dry gauze, followed by SpandaGrip. PATIENT EDUCATION focused on the need for compression. We are applying a Spandagrip on her RLE. It improves blood flow in the leg, prevents blood clotting and inhibits the progression of a variety of venous disorders. The Spandagrip helps squeeze the venous blood back up toward the heart, to enhance circulation.

## 2021-02-03 ENCOUNTER — OFFICE VISIT (OUTPATIENT)
Dept: SURGERY | Age: 81
End: 2021-02-03
Payer: MEDICARE

## 2021-02-03 VITALS
SYSTOLIC BLOOD PRESSURE: 170 MMHG | DIASTOLIC BLOOD PRESSURE: 91 MMHG | HEIGHT: 60 IN | WEIGHT: 165 LBS | HEART RATE: 86 BPM | BODY MASS INDEX: 32.39 KG/M2

## 2021-02-03 DIAGNOSIS — M79.89 LEG SWELLING: ICD-10-CM

## 2021-02-03 DIAGNOSIS — S81.801D OPEN WOUND OF RIGHT LOWER LEG, SUBSEQUENT ENCOUNTER: Primary | ICD-10-CM

## 2021-02-03 PROCEDURE — 97597 DBRDMT OPN WND 1ST 20 CM/<: CPT | Performed by: NURSE PRACTITIONER

## 2021-02-03 ASSESSMENT — ENCOUNTER SYMPTOMS: COLOR CHANGE: 1

## 2021-02-03 NOTE — PROGRESS NOTES
Subjective:      Patient ID: Mo Aj is a 78 y.o. female. Chief Complaint   Patient presents with    Follow-up     Pt is here today for a 1 week followup visit. Pain Assessment  Mo Aj has a pain level on 0/10 scale:  3  Location:  Right Shin  Description:  burning  Radiation:   No  Duration:  8 week(s)  Time:  constant      Wound Check       The symptoms first began on 11/23/2020, when the patient fell. She is on Coumadin and suffered a hematoma on her right shin. The hematoma was evacuated 12/3/2020. The patient is currently experiencing pain (burning) in the wound that she rates a 3 on a scale of 1-10. The patient reports associated symptoms of swelling and draining clear fluid. Current treatment includes Karoline moistened with NS, gauze dressing and an ACE wrap. Presently she has [facility] doing dressing changes. The skin tear on her right arm has healed. Review of Systems   Constitutional: Negative for chills and fever. Cardiovascular: Positive for leg swelling. Skin: Positive for color change and wound (right shin). Hematological: Bruises/bleeds easily (on Coumadin). All other systems reviewed and are negative. Allergies   Allergen Reactions    Adhesive Tape      rash       Prior to Visit Medications    Medication Sig Taking?  Authorizing Provider   potassium chloride (KLOR-CON M20) 20 MEQ extended release tablet Take 20 mEq by mouth 2 times daily Yes Historical Provider, MD   furosemide (LASIX) 20 MG tablet Take 20 mg by mouth 2 times daily Yes Historical Provider, MD   famotidine (PEPCID) 40 MG tablet Take 40 mg by mouth daily Yes Historical Provider, MD   warfarin (COUMADIN) 5 MG tablet Take 5 mg by mouth daily Indications: 6 days pwer week 2.5 1 day Yes Historical Provider, MD   metoprolol succinate (TOPROL XL) 50 MG extended release tablet Take 100 mg by mouth 2 times daily  Yes Historical Provider, MD Calcium Carb-Cholecalciferol (CALTRATE 600+D) 600-800 MG-UNIT TABS Take by mouth Yes Historical Provider, MD   Multiple Vitamins-Minerals (THERAPEUTIC MULTIVITAMIN-MINERALS) tablet Take 1 tablet by mouth daily Yes Historical Provider, MD   Fexofenadine HCl (ALLEGRA ALLERGY PO) Take by mouth Yes Historical Provider, MD   betamethasone valerate (VALISONE) 0.1 % cream Apply topically 2 times daily Apply topically 2 times daily. Yes Historical Provider, MD       History reviewed. Objective:   Physical Exam  Vitals signs and nursing note reviewed. Constitutional:       Appearance: Normal appearance. HENT:      Head: Normocephalic and atraumatic. Nose: Nose normal.      Mouth/Throat:      Pharynx: Oropharynx is clear. Eyes:      Conjunctiva/sclera: Conjunctivae normal.   Neck:      Musculoskeletal: Normal range of motion. Cardiovascular:      Pulses: Normal pulses. Comments:   GEOVANY'S 12/3/2020  Right:  P.T.: 200+ D.P.:200+ ARM BP: N/A      P.I.:WNO        Left:  P.T.:200+ D.P.: 200+ ARM BP: 156/86   P. I.: Caffie Jack    Pulmonary:      Effort: Pulmonary effort is normal.   Musculoskeletal:      Right lower leg: Right lower leg edema: nonpitting edema; controlled with SpandaGrip. Left lower leg: Left lower leg edema: nonpitting edema. Skin:     General: Skin is warm and dry. Capillary Refill: Capillary refill takes less than 2 seconds. Comments: Hemosiderin discoloration bilaterally    Right shin ulcer measures 2.3 cm x 1.3 cm x 0.1 cm   Neurological:      General: No focal deficit present. Mental Status: She is alert and oriented to person, place, and time. Cranial Nerves: No cranial nerve deficit. Sensory: No sensory deficit. Motor: No weakness.       Coordination: Coordination normal.      Gait: Gait (use of wheelchair) normal.   Psychiatric:         Mood and Affect: Mood normal.         Behavior: Behavior normal. Thought Content: Thought content normal.         Judgment: Judgment normal.         Assessment:      Diagnosis   1. Open wound of right lower leg, subsequent encounter    2. Leg swelling        Using a curette, debrided epidermis and dermis of wound, located right shin. Topical Lidocaine 2% was indicated. Dimensions of wound are 2.3 cm x 1.3 cm. Dressed area with Karoline moistened with NS, gauze dressing, elastic stocking, followed by Spandagrip. No immediate complications observed. Call office if signs of infection or fever, excessive swelling or pain occur. PATIENT EDUCATION is to focus on this week's wound care instructions. Cut a small piece of the Karoline (a size big enough to cover the wound bed) and moisten with the Saline Solution. Apply to wound bed and cover with dry gauze, followed by SpandaGrip.       PATIENT EDUCATION focused on the need for compression. We are applying a Spandagrip on her RLE. It improves blood flow in the leg, prevents blood clotting and inhibits the progression of a variety of venous disorders. The Spandagrip helps squeeze the venous blood back up toward the heart, to enhance circulation. Plan:        Return in about 1 week (around 2/10/2021) for right lower leg wound.

## 2021-02-10 ENCOUNTER — OFFICE VISIT (OUTPATIENT)
Dept: SURGERY | Age: 81
End: 2021-02-10
Payer: MEDICARE

## 2021-02-10 VITALS — BODY MASS INDEX: 32.39 KG/M2 | WEIGHT: 165 LBS | HEIGHT: 60 IN

## 2021-02-10 DIAGNOSIS — S81.801D OPEN WOUND OF RIGHT LOWER LEG, SUBSEQUENT ENCOUNTER: Primary | ICD-10-CM

## 2021-02-10 DIAGNOSIS — M79.89 LEG SWELLING: ICD-10-CM

## 2021-02-10 PROCEDURE — 97597 DBRDMT OPN WND 1ST 20 CM/<: CPT | Performed by: NURSE PRACTITIONER

## 2021-02-10 ASSESSMENT — ENCOUNTER SYMPTOMS: COLOR CHANGE: 1

## 2021-02-10 NOTE — PROGRESS NOTES
Subjective:      Patient ID: Syl Bourne is a 78 y.o. female. Chief Complaint   Patient presents with    Follow-up     Pt is here today for a wound followup. Pain Assessment  Syl Bourne has a pain level on 0/10 scale:  3  Location:  Right Shin  Description:  burning  Radiation:   No  Duration:  9 week(s)  Time:  constant      Wound Check       The symptoms first began on 11/23/2020, when the patient fell. She is on Coumadin and suffered a hematoma on her right shin. The hematoma was evacuated 12/3/2020. The patient is currently experiencing pain (burning) in the wound that she rates a 3 on a scale of 1-10. The patient reports associated symptoms of swelling and draining clear fluid. Current treatment includes Karoline moistened with NS, gauze dressing and an ACE wrap. Presently she has [facility] doing dressing changes. Review of Systems   Constitutional: Negative for chills and fever. Cardiovascular: Positive for leg swelling. Skin: Positive for color change and wound (right shin). Hematological: Bruises/bleeds easily (on Coumadin). All other systems reviewed and are negative. Allergies   Allergen Reactions    Adhesive Tape      rash       Prior to Visit Medications    Medication Sig Taking?  Authorizing Provider   potassium chloride (KLOR-CON M20) 20 MEQ extended release tablet Take 20 mEq by mouth 2 times daily Yes Historical Provider, MD   furosemide (LASIX) 20 MG tablet Take 20 mg by mouth 2 times daily Yes Historical Provider, MD   famotidine (PEPCID) 40 MG tablet Take 40 mg by mouth daily Yes Historical Provider, MD   warfarin (COUMADIN) 5 MG tablet Take 5 mg by mouth daily Indications: 6 days pwer week 2.5 1 day Yes Historical Provider, MD   metoprolol succinate (TOPROL XL) 50 MG extended release tablet Take 100 mg by mouth 2 times daily  Yes Historical Provider, MD Calcium Carb-Cholecalciferol (CALTRATE 600+D) 600-800 MG-UNIT TABS Take by mouth Yes Historical Provider, MD   Multiple Vitamins-Minerals (THERAPEUTIC MULTIVITAMIN-MINERALS) tablet Take 1 tablet by mouth daily Yes Historical Provider, MD   Fexofenadine HCl (ALLEGRA ALLERGY PO) Take by mouth Yes Historical Provider, MD   betamethasone valerate (VALISONE) 0.1 % cream Apply topically 2 times daily Apply topically 2 times daily. Yes Historical Provider, MD       History reviewed. Objective:   Physical Exam  Vitals signs and nursing note reviewed. Constitutional:       Appearance: Normal appearance. HENT:      Head: Normocephalic and atraumatic. Nose: Nose normal.      Mouth/Throat:      Pharynx: Oropharynx is clear. Eyes:      Conjunctiva/sclera: Conjunctivae normal.   Neck:      Musculoskeletal: Normal range of motion. Cardiovascular:      Pulses: Normal pulses. Comments:   GEOVANY'S 12/3/2020  Right:  P.T.: 200+ D.P.:200+ ARM BP: N/A      P.I.:WNO        Left:  P.T.:200+ D.P.: 200+ ARM BP: 156/86   P. I.: Asha Masseylin    Pulmonary:      Effort: Pulmonary effort is normal.   Musculoskeletal:      Right lower leg: Edema (+2 pitting edema) present. Left lower leg: Left lower leg edema: nonpitting edema. Skin:     General: Skin is warm and dry. Capillary Refill: Capillary refill takes less than 2 seconds. Comments: Hemosiderin discoloration bilaterally    Right shin ulcer measures 2.3 cm x 1.2 cm x 0.1 cm   Neurological:      General: No focal deficit present. Mental Status: She is alert and oriented to person, place, and time. Cranial Nerves: No cranial nerve deficit. Sensory: No sensory deficit. Motor: No weakness. Coordination: Coordination normal.      Gait: Gait (use of wheelchair) normal.   Psychiatric:         Mood and Affect: Mood normal.         Behavior: Behavior normal.         Thought Content:  Thought content normal.         Judgment: Judgment normal. Assessment:      Diagnosis   1. Open wound of right lower leg, subsequent encounter    2. Leg swelling        Using a curette, debrided epidermis and dermis of wound, located right shin. Topical Lidocaine 2% was indicated. Dimensions of wound are 2.3 cm x 1.2 cm. Dressed area with Karoline moistened with NS, gauze dressing, elastic stocking, followed by Spandagrip. No immediate complications observed. Call office if signs of infection or fever, excessive swelling or pain occur. PATIENT EDUCATION is to focus on this week's wound care instructions. Cut a small piece of the Karoline (a size big enough to cover the wound bed) and moisten with the Saline Solution. Apply to wound bed and cover with dry gauze, followed by SpandaGrip.       PATIENT EDUCATION focused on the need for compression. We are applying a Spandagrip on her RLE. It improves blood flow in the leg, prevents blood clotting and inhibits the progression of a variety of venous disorders. The Spandagrip helps squeeze the venous blood back up toward the heart, to enhance circulation. Plan:        Return in about 1 week (around 2/17/2021) for right shin wound.

## 2021-02-10 NOTE — PATIENT INSTRUCTIONS
Return in about 1 week (around 2/17/2021) for right shin wound. PATIENT EDUCATION is to focus on this week's wound care instructions. Cleanse wound with normal saline. Cut a small piece of the Karoline (a size big enough to cover the wound bed) and moisten with the Saline Solution. Apply to wound bed and cover with dry gauze, followed by SpandaGrip.

## 2021-02-17 ENCOUNTER — OFFICE VISIT (OUTPATIENT)
Dept: SURGERY | Age: 81
End: 2021-02-17
Payer: MEDICARE

## 2021-02-17 VITALS
SYSTOLIC BLOOD PRESSURE: 158 MMHG | HEIGHT: 60 IN | HEART RATE: 97 BPM | BODY MASS INDEX: 32.39 KG/M2 | DIASTOLIC BLOOD PRESSURE: 100 MMHG | WEIGHT: 165 LBS

## 2021-02-17 DIAGNOSIS — S81.801D OPEN WOUND OF RIGHT LOWER LEG, SUBSEQUENT ENCOUNTER: Primary | ICD-10-CM

## 2021-02-17 DIAGNOSIS — M79.89 LEG SWELLING: ICD-10-CM

## 2021-02-17 PROCEDURE — 97597 DBRDMT OPN WND 1ST 20 CM/<: CPT | Performed by: NURSE PRACTITIONER

## 2021-02-17 ASSESSMENT — ENCOUNTER SYMPTOMS: COLOR CHANGE: 1

## 2021-02-17 NOTE — PROGRESS NOTES
Subjective:      Patient ID: Andrews Russell is a 78 y.o. female. Chief Complaint   Patient presents with    Wound Check     Pt is here today for a 1 week followup visit. Pain Assessment  The patient is currently not experiencing any pain at this time. Wound Check       The symptoms first began on 11/23/2020, when the patient fell. She is on Coumadin and suffered a hematoma on her right shin. The hematoma was evacuated 12/3/2020. The patient is currently not experiencing any pain. She reports associated symptoms of leg swelling and draining clear fluid. Current treatment includes Karoline moistened with NS, gauze dressing and an ACE wrap. Presently she has [facility] doing dressing changes. Review of Systems   Constitutional: Negative for chills and fever. Cardiovascular: Positive for leg swelling. Skin: Positive for color change and wound (right shin). Hematological: Bruises/bleeds easily (on Coumadin). All other systems reviewed and are negative. Allergies   Allergen Reactions    Adhesive Tape      rash       Prior to Visit Medications    Medication Sig Taking?  Authorizing Provider   potassium chloride (KLOR-CON M20) 20 MEQ extended release tablet Take 20 mEq by mouth 2 times daily Yes Historical Provider, MD   furosemide (LASIX) 20 MG tablet Take 20 mg by mouth 2 times daily Yes Historical Provider, MD   famotidine (PEPCID) 40 MG tablet Take 40 mg by mouth daily Yes Historical Provider, MD   warfarin (COUMADIN) 5 MG tablet Take 5 mg by mouth daily Indications: 6 days pwer week 2.5 1 day Yes Historical Provider, MD   metoprolol succinate (TOPROL XL) 50 MG extended release tablet Take 100 mg by mouth 2 times daily  Yes Historical Provider, MD   Calcium Carb-Cholecalciferol (CALTRATE 600+D) 600-800 MG-UNIT TABS Take by mouth Yes Historical Provider, MD Multiple Vitamins-Minerals (THERAPEUTIC MULTIVITAMIN-MINERALS) tablet Take 1 tablet by mouth daily Yes Historical Provider, MD   Fexofenadine HCl (ALLEGRA ALLERGY PO) Take by mouth Yes Historical Provider, MD   betamethasone valerate (VALISONE) 0.1 % cream Apply topically 2 times daily Apply topically 2 times daily. Yes Historical Provider, MD       History reviewed. Objective:   Physical Exam  Vitals signs and nursing note reviewed. Constitutional:       Appearance: Normal appearance. HENT:      Head: Normocephalic and atraumatic. Nose: Nose normal.      Mouth/Throat:      Pharynx: Oropharynx is clear. Eyes:      Conjunctiva/sclera: Conjunctivae normal.   Neck:      Musculoskeletal: Normal range of motion. Cardiovascular:      Pulses: Normal pulses. Comments:   [facility] 12/3/2020  Right:  P.T.: 200+ D.P.:200+ ARM BP: N/A      P.I.:WNO        Left:  P.T.:200+ D.P.: 200+ ARM BP: 156/86   P. I.: Yandy Bees    Pulmonary:      Effort: Pulmonary effort is normal.   Musculoskeletal:      Right lower leg: Edema (+2 pitting edema) present. Left lower leg: Left lower leg edema: nonpitting edema. Skin:     General: Skin is warm and dry. Capillary Refill: Capillary refill takes less than 2 seconds. Comments: Hemosiderin discoloration bilaterally    Right shin ulcer measures 2.1 cm x 1.1 cm x 0.1 cm   Neurological:      General: No focal deficit present. Mental Status: She is alert and oriented to person, place, and time. Cranial Nerves: No cranial nerve deficit. Sensory: No sensory deficit. Motor: No weakness. Coordination: Coordination normal.      Gait: Gait (use of wheelchair) normal.   Psychiatric:         Mood and Affect: Mood normal.         Behavior: Behavior normal.         Thought Content: Thought content normal.         Judgment: Judgment normal.         Assessment:      Diagnosis   1.  Open wound of right lower leg, subsequent encounter

## 2021-02-17 NOTE — PATIENT INSTRUCTIONS
Return in about 1 week (around 2/24/2021) for right shin wound. PATIENT EDUCATION is to focus on this week's wound care instructions. Cut a small piece of the Karoline (a size big enough to cover the wound bed) and moisten with the Saline Solution. Apply to wound bed and cover with dry gauze, followed by SpandaGrip.       PATIENT EDUCATION focused on the need for compression. We are applying a Spandagrip on her RLE. It improves blood flow in the leg, prevents blood clotting and inhibits the progression of a variety of venous disorders. The Spandagrip helps squeeze the venous blood back up toward the heart, to enhance circulation.

## 2021-02-24 ENCOUNTER — OFFICE VISIT (OUTPATIENT)
Dept: SURGERY | Age: 81
End: 2021-02-24
Payer: MEDICARE

## 2021-02-24 VITALS
BODY MASS INDEX: 32.39 KG/M2 | DIASTOLIC BLOOD PRESSURE: 97 MMHG | HEIGHT: 60 IN | SYSTOLIC BLOOD PRESSURE: 142 MMHG | WEIGHT: 165 LBS | HEART RATE: 86 BPM

## 2021-02-24 DIAGNOSIS — S81.801D OPEN WOUND OF RIGHT LOWER LEG, SUBSEQUENT ENCOUNTER: Primary | ICD-10-CM

## 2021-02-24 DIAGNOSIS — M79.89 LEG SWELLING: ICD-10-CM

## 2021-02-24 PROCEDURE — 97597 DBRDMT OPN WND 1ST 20 CM/<: CPT | Performed by: NURSE PRACTITIONER

## 2021-02-24 RX ORDER — HYDROCHLOROTHIAZIDE 25 MG/1
25 TABLET ORAL DAILY
COMMUNITY

## 2021-02-24 ASSESSMENT — ENCOUNTER SYMPTOMS: COLOR CHANGE: 1

## 2021-02-24 NOTE — PATIENT INSTRUCTIONS
Return in about 1 week (around 3/3/2021) for right shin wound/leg swelling.     PATIENT EDUCATION focused on the need for some mild compression. We applied a Spandagrip to her right lower leg. It improves blood flow in the leg, prevents blood clotting and inhibits the progression of a variety of venous disorders. The Spandagrip helps squeeze the venous blood back up toward the heart, to enhance circulation.

## 2021-02-25 NOTE — PROGRESS NOTES
Subjective:      Patient ID: Beryle Melbourne is a 78 y.o. female. Chief Complaint   Patient presents with    Wound Check     One week follow up on right LE wound    Leg Swelling       Pain Assessment  The patient is currently not experiencing any pain at this time. Wound Check       The symptoms first began on 11/23/2020, when the patient fell. She is on Coumadin and suffered a hematoma on her right shin. The hematoma was evacuated 12/3/2020. The patient is currently not experiencing any pain. She reports associated symptoms of leg swelling and draining clear fluid. Current treatment includes Karoline moistened with NS, gauze dressing and an ACE wrap. She presently has [facility] doing dressing changes. Review of Systems   Constitutional: Negative for chills and fever. Cardiovascular: Positive for leg swelling. Skin: Positive for color change and wound (right shin). Hematological: Bruises/bleeds easily (on Coumadin). All other systems reviewed and are negative. Allergies   Allergen Reactions    Adhesive Tape      rash       Prior to Visit Medications    Medication Sig Taking?  Authorizing Provider   hydroCHLOROthiazide (HYDRODIURIL) 25 MG tablet Take 25 mg by mouth daily Yes Historical Provider, MD   potassium chloride (KLOR-CON M20) 20 MEQ extended release tablet Take 20 mEq by mouth 2 times daily Yes Historical Provider, MD   furosemide (LASIX) 20 MG tablet Take 20 mg by mouth 2 times daily Yes Historical Provider, MD   famotidine (PEPCID) 40 MG tablet Take 40 mg by mouth daily Yes Historical Provider, MD   warfarin (COUMADIN) 5 MG tablet Take 5 mg by mouth daily Indications: 6 days pwer week 2.5 1 day Yes Historical Provider, MD   metoprolol succinate (TOPROL XL) 50 MG extended release tablet Take 100 mg by mouth 2 times daily  Yes Historical Provider, MD   Calcium Carb-Cholecalciferol (CALTRATE 600+D) 600-800 MG-UNIT TABS Take by mouth Yes Historical Provider, MD Multiple Vitamins-Minerals (THERAPEUTIC MULTIVITAMIN-MINERALS) tablet Take 1 tablet by mouth daily Yes Historical Provider, MD   Fexofenadine HCl (ALLEGRA ALLERGY PO) Take by mouth Yes Historical Provider, MD   betamethasone valerate (VALISONE) 0.1 % cream Apply topically 2 times daily Apply topically 2 times daily. Yes Historical Provider, MD       History reviewed. Objective:   Physical Exam  Vitals signs and nursing note reviewed. Constitutional:       Appearance: Normal appearance. HENT:      Head: Normocephalic and atraumatic. Nose: Nose normal.      Mouth/Throat:      Pharynx: Oropharynx is clear. Eyes:      Conjunctiva/sclera: Conjunctivae normal.   Neck:      Musculoskeletal: Normal range of motion. Cardiovascular:      Pulses: Normal pulses. Comments:   [facility] 12/3/2020  Right:  P.T.: 200+ D.P.:200+ ARM BP: N/A      P.I.:WNO        Left:  P.T.:200+ D.P.: 200+ ARM BP: 156/86   P. I.: Alex Connell    Pulmonary:      Effort: Pulmonary effort is normal.   Musculoskeletal:      Right lower leg: Edema (+2 pitting edema) present. Left lower leg: Left lower leg edema: nonpitting edema. Skin:     General: Skin is warm and dry. Capillary Refill: Capillary refill takes less than 2 seconds. Comments: Hemosiderin discoloration bilaterally    Right shin ulcer measures 2 cm x 1 cm x 0.1 cm   Neurological:      General: No focal deficit present. Mental Status: She is alert and oriented to person, place, and time. Cranial Nerves: No cranial nerve deficit. Sensory: No sensory deficit. Motor: No weakness. Coordination: Coordination normal.      Gait: Gait (use of wheelchair) normal.   Psychiatric:         Mood and Affect: Mood normal.         Behavior: Behavior normal.         Thought Content: Thought content normal.         Judgment: Judgment normal.         Assessment:      Diagnosis   1. Open wound of right lower leg, subsequent encounter    2.  Leg swelling Using a curette, debrided epidermis and dermis of wound, located right shin. Topical Lidocaine 2% was indicated. Dimensions of wound are 2 cm x 1 cm. Dressed area with Karoline moistened with NS, gauze dressing, elastic stocking, followed by Spandagrip. No immediate complications observed. Call office if signs of infection or fever, excessive swelling or pain occur. PATIENT EDUCATION is to focus on this week's wound care instructions. Cut a small piece of the Karoline (a size big enough to cover the wound bed) and moisten with the Saline Solution. Apply to wound bed and cover with dry gauze, followed by SpandaGrip.       PATIENT EDUCATION focused on the need for compression. We are applying a Spandagrip on her RLE. It improves blood flow in the leg, prevents blood clotting and inhibits the progression of a variety of venous disorders. The Spandagrip helps squeeze the venous blood back up toward the heart, to enhance circulation. Plan:        Return in about 1 week (around 3/3/2021) for right shin wound/leg swelling.

## 2021-03-03 ENCOUNTER — OFFICE VISIT (OUTPATIENT)
Dept: SURGERY | Age: 81
End: 2021-03-03
Payer: MEDICARE

## 2021-03-03 DIAGNOSIS — S81.801D OPEN WOUND OF RIGHT LOWER LEG, SUBSEQUENT ENCOUNTER: Primary | ICD-10-CM

## 2021-03-03 DIAGNOSIS — M79.89 LEG SWELLING: ICD-10-CM

## 2021-03-03 PROCEDURE — 97597 DBRDMT OPN WND 1ST 20 CM/<: CPT | Performed by: NURSE PRACTITIONER

## 2021-03-03 ASSESSMENT — ENCOUNTER SYMPTOMS: COLOR CHANGE: 1

## 2021-03-03 NOTE — PATIENT INSTRUCTIONS
Return in about 1 week (around 3/10/2021) for RLE wound. PATIENT INSTRUCTIONS:  Moisten the old dressing to get it removed. After moistening a small piece of Karoline, cut it to fit the wound bed. Cover with a dry gauze dressing, followed by the SpandaGrip.

## 2021-03-03 NOTE — PROGRESS NOTES
Subjective:      Patient ID: Lian Calvillo is a 78 y.o. female. Chief Complaint   Patient presents with    Follow-up     Pt is here today for a wound check. Pain Assessment  The patient is currently not experiencing any pain at this time. Wound Check       The symptoms first began on 11/23/2020, when the patient fell. She is on Coumadin and suffered a hematoma on her right shin. The hematoma was evacuated 12/3/2020. The patient is currently not experiencing any pain. She reports associated symptoms of leg swelling and draining clear fluid. Current treatment includes Karoline moistened with NS, gauze dressing and an ACE wrap. She presently has [facility] doing dressing changes. Review of Systems   Constitutional: Negative for chills and fever. Cardiovascular: Positive for leg swelling. Skin: Positive for color change and wound (right shin). Hematological: Bruises/bleeds easily (on Coumadin). All other systems reviewed and are negative. Allergies   Allergen Reactions    Adhesive Tape      rash       Prior to Visit Medications    Medication Sig Taking?  Authorizing Provider   hydroCHLOROthiazide (HYDRODIURIL) 25 MG tablet Take 25 mg by mouth daily Yes Historical Provider, MD   potassium chloride (KLOR-CON M20) 20 MEQ extended release tablet Take 20 mEq by mouth 2 times daily Yes Historical Provider, MD   furosemide (LASIX) 20 MG tablet Take 20 mg by mouth 2 times daily Yes Historical Provider, MD   famotidine (PEPCID) 40 MG tablet Take 40 mg by mouth daily Yes Historical Provider, MD   warfarin (COUMADIN) 5 MG tablet Take 5 mg by mouth daily Indications: 6 days pwer week 2.5 1 day Yes Historical Provider, MD   metoprolol succinate (TOPROL XL) 50 MG extended release tablet Take 100 mg by mouth 2 times daily  Yes Historical Provider, MD   Calcium Carb-Cholecalciferol (CALTRATE 600+D) 600-800 MG-UNIT TABS Take by mouth Yes Historical Provider, MD   Multiple Vitamins-Minerals (THERAPEUTIC MULTIVITAMIN-MINERALS) tablet Take 1 tablet by mouth daily Yes Historical Provider, MD   Fexofenadine HCl (ALLEGRA ALLERGY PO) Take by mouth Yes Historical Provider, MD   betamethasone valerate (VALISONE) 0.1 % cream Apply topically 2 times daily Apply topically 2 times daily. Yes Historical Provider, MD       History reviewed. Objective:   Physical Exam  Vitals signs and nursing note reviewed. Constitutional:       Appearance: Normal appearance. HENT:      Head: Normocephalic and atraumatic. Nose: Nose normal.      Mouth/Throat:      Pharynx: Oropharynx is clear. Eyes:      Conjunctiva/sclera: Conjunctivae normal.   Neck:      Musculoskeletal: Normal range of motion. Cardiovascular:      Pulses: Normal pulses. Comments:   [facility] 12/3/2020  Right:  P.T.: 200+ D.P.:200+ ARM BP: N/A      P.I.:WNO        Left:  P.T.:200+ D.P.: 200+ ARM BP: 156/86   P. I.: Zulma Parish    Pulmonary:      Effort: Pulmonary effort is normal.   Musculoskeletal:      Right lower leg: Edema (+2 pitting edema) present. Left lower leg: Left lower leg edema: nonpitting edema. Skin:     General: Skin is warm and dry. Capillary Refill: Capillary refill takes less than 2 seconds. Comments: Hemosiderin discoloration bilaterally    Right shin ulcer measures 1.8 cm x 0.9 cm x 0.1 cm   Neurological:      General: No focal deficit present. Mental Status: She is alert and oriented to person, place, and time. Cranial Nerves: No cranial nerve deficit. Sensory: No sensory deficit. Motor: No weakness. Coordination: Coordination normal.      Gait: Gait (use of wheelchair) normal.   Psychiatric:         Mood and Affect: Mood normal.         Behavior: Behavior normal.         Thought Content: Thought content normal.         Judgment: Judgment normal.         Assessment:      Diagnosis   1. Open wound of right lower leg, subsequent encounter    2.  Leg swelling        Using a curette, debrided epidermis and dermis of wound, located right shin. Dimensions of wound are 1.8 cm x 0.9 cm. Dressed area with Karoline moistened with NS, gauze dressing, elastic stocking, followed by Spandagrip. No immediate complications observed. Call office if signs of infection or fever, excessive swelling or pain occur. PATIENT INSTRUCTIONS:  Moisten the old dressing to get it removed. After moistening a small piece of Karoline, cut it to fit the wound bed. Cover with a dry gauze dressing, followed by the SpandaGrip. PATIENT EDUCATION focused on the need for compression. We are applying a Spandagrip on her RLE. It improves blood flow in the leg, prevents blood clotting and inhibits the progression of a variety of venous disorders. The Spandagrip helps squeeze the venous blood back up toward the heart, to enhance circulation. Plan:        Return in about 1 week (around 3/10/2021) for RLE wound.

## 2021-03-10 ENCOUNTER — OFFICE VISIT (OUTPATIENT)
Dept: SURGERY | Age: 81
End: 2021-03-10
Payer: MEDICARE

## 2021-03-10 VITALS
SYSTOLIC BLOOD PRESSURE: 163 MMHG | HEIGHT: 60 IN | BODY MASS INDEX: 32.39 KG/M2 | WEIGHT: 165 LBS | DIASTOLIC BLOOD PRESSURE: 88 MMHG

## 2021-03-10 DIAGNOSIS — S81.801D OPEN WOUND OF RIGHT LOWER LEG, SUBSEQUENT ENCOUNTER: Primary | ICD-10-CM

## 2021-03-10 PROCEDURE — 97597 DBRDMT OPN WND 1ST 20 CM/<: CPT | Performed by: NURSE PRACTITIONER

## 2021-03-10 ASSESSMENT — ENCOUNTER SYMPTOMS: COLOR CHANGE: 1

## 2021-03-10 NOTE — PROGRESS NOTES
Subjective:      Patient ID: Jean Yang is a 78 y.o. female. Chief Complaint   Patient presents with    Wound Check     Pt is here today for a 1 week followup. Pain Assessment  The patient is currently not experiencing any pain at this time. Wound Check       The symptoms first began on 11/23/2020, when the patient fell. She is on Coumadin and suffered a hematoma on her right shin. The hematoma was evacuated 12/3/2020. The patient is currently not experiencing any pain. She reports associated symptoms of leg swelling and draining clear fluid. Current treatment includes Karoline moistened with NS, gauze dressing and an ACE wrap. She presently has [facility] doing dressing changes. Review of Systems   Constitutional: Negative for chills and fever. Cardiovascular: Positive for leg swelling. Skin: Positive for color change and wound (right shin). Hematological: Bruises/bleeds easily (on Coumadin). All other systems reviewed and are negative. Allergies   Allergen Reactions    Adhesive Tape      rash       Prior to Visit Medications    Medication Sig Taking?  Authorizing Provider   hydroCHLOROthiazide (HYDRODIURIL) 25 MG tablet Take 25 mg by mouth daily Yes Historical Provider, MD   potassium chloride (KLOR-CON M20) 20 MEQ extended release tablet Take 20 mEq by mouth 2 times daily Yes Historical Provider, MD   furosemide (LASIX) 20 MG tablet Take 20 mg by mouth 2 times daily Yes Historical Provider, MD   famotidine (PEPCID) 40 MG tablet Take 40 mg by mouth daily Yes Historical Provider, MD   warfarin (COUMADIN) 5 MG tablet Take 5 mg by mouth daily Indications: 6 days pwer week 2.5 1 day Yes Historical Provider, MD   metoprolol succinate (TOPROL XL) 50 MG extended release tablet Take 100 mg by mouth 2 times daily  Yes Historical Provider, MD   Calcium Carb-Cholecalciferol (CALTRATE 600+D) 600-800 MG-UNIT TABS Take by mouth Yes Historical Provider, MD   Multiple Vitamins-Minerals (THERAPEUTIC MULTIVITAMIN-MINERALS) tablet Take 1 tablet by mouth daily Yes Historical Provider, MD   Fexofenadine HCl (ALLEGRA ALLERGY PO) Take by mouth Yes Historical Provider, MD   betamethasone valerate (VALISONE) 0.1 % cream Apply topically 2 times daily Apply topically 2 times daily. Yes Historical Provider, MD       History reviewed. Objective:   Physical Exam  Vitals signs and nursing note reviewed. Constitutional:       Appearance: Normal appearance. HENT:      Head: Normocephalic and atraumatic. Nose: Nose normal.      Mouth/Throat:      Pharynx: Oropharynx is clear. Eyes:      Conjunctiva/sclera: Conjunctivae normal.   Neck:      Musculoskeletal: Normal range of motion. Cardiovascular:      Pulses: Normal pulses. Comments:   [facility] 12/3/2020  Right:  P.T.: 200+ D.P.:200+ ARM BP: N/A      P.I.:WNO        Left:  P.T.:200+ D.P.: 200+ ARM BP: 156/86   P. I.: Rubi Bear    Pulmonary:      Effort: Pulmonary effort is normal.   Musculoskeletal:      Right lower leg: Edema (+2 pitting edema) present. Left lower leg: Left lower leg edema: nonpitting edema. Skin:     General: Skin is warm and dry. Capillary Refill: Capillary refill takes less than 2 seconds. Comments: Hemosiderin discoloration bilaterally    Right shin ulcer measures 1.7 cm x 0.8 cm x 0.1 cm   Neurological:      General: No focal deficit present. Mental Status: She is alert and oriented to person, place, and time. Cranial Nerves: No cranial nerve deficit. Sensory: No sensory deficit. Motor: No weakness. Coordination: Coordination normal.      Gait: Gait (use of wheelchair) normal.   Psychiatric:         Mood and Affect: Mood normal.         Behavior: Behavior normal.         Thought Content: Thought content normal.         Judgment: Judgment normal.         Assessment:      Diagnosis   1. Open wound of right lower leg, subsequent encounter    2.  Leg swelling Using a curette, debrided epidermis and dermis of wound, located right shin. Dimensions of wound are 1.7 cm x 0.8 cm. Dressed area with Karoline moistened with NS, gauze dressing, elastic stocking, followed by Spandagrip. No immediate complications observed. Call office if signs of infection or fever, excessive swelling or pain occur. PATIENT INSTRUCTIONS:  Moisten the old dressing to get it removed. After moistening a small piece of Karoline, cut it to fit the wound bed. Cover with a dry gauze dressing, followed by the SpandaGrip. PATIENT EDUCATION focused on the need for compression. We are applying a Spandagrip on her RLE. It improves blood flow in the leg, prevents blood clotting and inhibits the progression of a variety of venous disorders. The Spandagrip helps squeeze the venous blood back up toward the heart, to enhance circulation. Plan:        Return in about 1 week (around 3/17/2021) for right shin wound.

## 2021-03-10 NOTE — PATIENT INSTRUCTIONS
Return in about 1 week (around 3/17/2021) for right shin wound. PATIENT INSTRUCTIONS:  Moisten the old dressing to get it removed. After moistening a small piece of Karoline, cut it to fit the wound bed. Cover with a dry gauze dressing, followed by the SpandaGrip. PATIENT EDUCATION focused on the need for compression. We are applying a Spandagrip on her RLE. It improves blood flow in the leg, prevents blood clotting and inhibits the progression of a variety of venous disorders. The Spandagrip helps squeeze the venous blood back up toward the heart, to enhance circulation.

## 2021-03-17 ENCOUNTER — OFFICE VISIT (OUTPATIENT)
Dept: SURGERY | Age: 81
End: 2021-03-17
Payer: MEDICARE

## 2021-03-17 VITALS
DIASTOLIC BLOOD PRESSURE: 86 MMHG | BODY MASS INDEX: 32.39 KG/M2 | SYSTOLIC BLOOD PRESSURE: 148 MMHG | HEIGHT: 60 IN | WEIGHT: 165 LBS

## 2021-03-17 DIAGNOSIS — S81.801D OPEN WOUND OF RIGHT LOWER LEG, SUBSEQUENT ENCOUNTER: Primary | ICD-10-CM

## 2021-03-17 DIAGNOSIS — M79.89 LEG SWELLING: ICD-10-CM

## 2021-03-17 PROCEDURE — 97597 DBRDMT OPN WND 1ST 20 CM/<: CPT | Performed by: NURSE PRACTITIONER

## 2021-03-17 ASSESSMENT — ENCOUNTER SYMPTOMS: COLOR CHANGE: 1

## 2021-03-17 NOTE — PROGRESS NOTES
Subjective:      Patient ID: Derek Ortiz is a 78 y.o. female. Chief Complaint   Patient presents with    Wound Check     Pt is here today for a 1 week followup visit.  Leg Swelling       Pain Assessment  The patient is currently not experiencing any pain at this time. Wound Check       The symptoms first began on 11/23/2020, when the patient fell. She is on Coumadin and suffered a hematoma on her right shin. The hematoma was evacuated 12/3/2020. The patient is currently not experiencing any pain. She reports associated symptoms of leg swelling and draining clear fluid. Current treatment includes Karoline moistened with NS, gauze dressing and an ACE wrap. She presently has [facility] doing dressing changes. Review of Systems   Constitutional: Negative for chills and fever. Cardiovascular: Positive for leg swelling. Skin: Positive for color change and wound (right shin). Hematological: Bruises/bleeds easily (on Coumadin). All other systems reviewed and are negative. Allergies   Allergen Reactions    Adhesive Tape      rash       Prior to Visit Medications    Medication Sig Taking?  Authorizing Provider   hydroCHLOROthiazide (HYDRODIURIL) 25 MG tablet Take 25 mg by mouth daily Yes Historical Provider, MD   potassium chloride (KLOR-CON M20) 20 MEQ extended release tablet Take 20 mEq by mouth 2 times daily Yes Historical Provider, MD   furosemide (LASIX) 20 MG tablet Take 20 mg by mouth 2 times daily Yes Historical Provider, MD   famotidine (PEPCID) 40 MG tablet Take 40 mg by mouth daily Yes Historical Provider, MD   warfarin (COUMADIN) 5 MG tablet Take 5 mg by mouth daily Indications: 6 days pwer week 2.5 1 day Yes Historical Provider, MD   metoprolol succinate (TOPROL XL) 50 MG extended release tablet Take 100 mg by mouth 2 times daily  Yes Historical Provider, MD   Calcium Carb-Cholecalciferol (CALTRATE 600+D) 600-800 MG-UNIT TABS Take by mouth Yes Historical Provider, MD Multiple Vitamins-Minerals (THERAPEUTIC MULTIVITAMIN-MINERALS) tablet Take 1 tablet by mouth daily Yes Historical Provider, MD   Fexofenadine HCl (ALLEGRA ALLERGY PO) Take by mouth Yes Historical Provider, MD   betamethasone valerate (VALISONE) 0.1 % cream Apply topically 2 times daily Apply topically 2 times daily. Yes Historical Provider, MD       History reviewed. Objective:   Physical Exam  Vitals signs and nursing note reviewed. Constitutional:       Appearance: Normal appearance. HENT:      Head: Normocephalic and atraumatic. Nose: Nose normal.      Mouth/Throat:      Pharynx: Oropharynx is clear. Eyes:      Conjunctiva/sclera: Conjunctivae normal.   Neck:      Musculoskeletal: Normal range of motion. Cardiovascular:      Pulses: Normal pulses. Comments:   GEOVANY'S 12/3/2020  Right:  P.T.: 200+ D.P.:200+ ARM BP: N/A      P.I.:WNO        Left:  P.T.:200+ D.P.: 200+ ARM BP: 156/86   P. I.: Asha Rivera    Pulmonary:      Effort: Pulmonary effort is normal.   Musculoskeletal:      Right lower leg: Edema (+2 pitting edema) present. Left lower leg: Left lower leg edema: nonpitting edema. Skin:     General: Skin is warm and dry. Capillary Refill: Capillary refill takes less than 2 seconds. Comments: Hemosiderin discoloration bilaterally    Right shin ulcer measures 1.4 cm x 0.6 cm x 0.1 cm   Neurological:      General: No focal deficit present. Mental Status: She is alert and oriented to person, place, and time. Cranial Nerves: No cranial nerve deficit. Sensory: No sensory deficit. Motor: No weakness. Coordination: Coordination normal.      Gait: Gait (use of wheelchair) normal.   Psychiatric:         Mood and Affect: Mood normal.         Behavior: Behavior normal.         Thought Content: Thought content normal.         Judgment: Judgment normal.         Assessment:      Diagnosis   1. Open wound of right lower leg, subsequent encounter    2.  Leg swelling Using a curette, debrided epidermis and dermis of wound, located right shin. Dimensions of wound are 1.4 cm x 0.6 cm. Dressed area with Karoline moistened with NS, gauze dressing, elastic stocking, followed by Spandagrip. No immediate complications observed. Call office if signs of infection or fever, excessive swelling or pain occur. PATIENT INSTRUCTIONS:  Moisten the old dressing to get it removed. After moistening a small piece of Karoline, cut it to fit the wound bed. Cover with a dry gauze dressing, followed by the SpandaGrip. PATIENT EDUCATION focused on the need for compression. We are applying a Spandagrip on her RLE. It improves blood flow in the leg, prevents blood clotting and inhibits the progression of a variety of venous disorders. The Spandagrip helps squeeze the venous blood back up toward the heart, to enhance circulation. Plan:          Return in about 1 week (around 3/24/2021) for right shin wound.

## 2021-03-17 NOTE — PATIENT INSTRUCTIONS
Return in about 1 week (around 3/24/2021) for right shin wound. PATIENT INSTRUCTIONS:  Moisten the old dressing to get it removed. After moistening a small piece of Karoline, cut it to fit the wound bed. Cover with a dry gauze dressing, followed by the SpandaGrip.

## 2021-03-24 ENCOUNTER — OFFICE VISIT (OUTPATIENT)
Dept: SURGERY | Age: 81
End: 2021-03-24
Payer: MEDICARE

## 2021-03-24 VITALS
DIASTOLIC BLOOD PRESSURE: 93 MMHG | SYSTOLIC BLOOD PRESSURE: 156 MMHG | WEIGHT: 165 LBS | HEIGHT: 60 IN | BODY MASS INDEX: 32.39 KG/M2

## 2021-03-24 DIAGNOSIS — S81.801D OPEN WOUND OF RIGHT LOWER LEG, SUBSEQUENT ENCOUNTER: Primary | ICD-10-CM

## 2021-03-24 DIAGNOSIS — M79.89 LEG SWELLING: ICD-10-CM

## 2021-03-24 PROCEDURE — 97597 DBRDMT OPN WND 1ST 20 CM/<: CPT | Performed by: NURSE PRACTITIONER

## 2021-03-24 ASSESSMENT — ENCOUNTER SYMPTOMS: COLOR CHANGE: 1

## 2021-03-24 NOTE — PROGRESS NOTES
Subjective:      Patient ID: Khris Santos is a 78 y.o. female. Chief Complaint   Patient presents with    Wound Check     FU on right shin wound/leg swelling    Leg Swelling       Pain Assessment  The patient is currently not experiencing any pain at this time. Wound Check       The symptoms first began on 11/23/2020, when the patient fell. She is on Coumadin and suffered a hematoma on her right shin. The hematoma was evacuated 12/3/2020. The patient is currently not experiencing any pain. She reports associated symptoms of leg swelling and draining clear fluid. Current treatment includes Karoline moistened with NS, gauze dressing and an ACE wrap. She presently has [facility] doing dressing changes. Review of Systems   Constitutional: Negative for chills and fever. Cardiovascular: Positive for leg swelling. Skin: Positive for color change and wound (right shin). Hematological: Bruises/bleeds easily (on Coumadin). All other systems reviewed and are negative. Allergies   Allergen Reactions    Adhesive Tape      rash       Prior to Visit Medications    Medication Sig Taking? Authorizing Provider   silver sulfADIAZINE (SILVADENE) 1 % cream Apply topically to right shin wound twice weekly.  Yes Avani Hess APRN - [facility]   hydroCHLOROthiazide (HYDRODIURIL) 25 MG tablet Take 25 mg by mouth daily Yes Historical Provider, MD   potassium chloride (KLOR-CON M20) 20 MEQ extended release tablet Take 20 mEq by mouth 2 times daily Yes Historical Provider, MD   furosemide (LASIX) 20 MG tablet Take 20 mg by mouth 2 times daily Yes Historical Provider, MD   famotidine (PEPCID) 40 MG tablet Take 40 mg by mouth daily Yes Historical Provider, MD   warfarin (COUMADIN) 5 MG tablet Take 5 mg by mouth daily Indications: 6 days pwer week 2.5 1 day Yes Historical Provider, MD   metoprolol succinate (TOPROL XL) 50 MG extended release tablet Take 100 mg by mouth 2 times daily  Yes Historical Provider, normal.         Assessment:      Diagnosis   1. Open wound of right lower leg, subsequent encounter    2. Leg swelling        Using a curette, debrided epidermis and dermis of wound, located right shin. Dimensions of wound are 1.5 cm x 0.6 cm. Dressed area with Silvadene Cream, gauze dressing, followed by Spandagrip. No immediate complications observed. Call office if signs of infection or fever, excessive swelling or pain occur. Apply Silvadene Cream to wound bed, cover with dry gauze dressing. Wear Spandagrip daily to decrease amount of swelling in lower leg. PATIENT EDUCATION focused on the need for compression. We are applying a Spandagrip on her RLE. It improves blood flow in the leg, prevents blood clotting and inhibits the progression of a variety of venous disorders. The Spandagrip helps squeeze the venous blood back up toward the heart, to enhance circulation. Plan:          Return in about 1 week (around 3/31/2021) for right shin wound.

## 2021-03-31 ENCOUNTER — OFFICE VISIT (OUTPATIENT)
Dept: SURGERY | Age: 81
End: 2021-03-31
Payer: MEDICARE

## 2021-03-31 VITALS — HEIGHT: 60 IN | WEIGHT: 165 LBS | BODY MASS INDEX: 32.39 KG/M2

## 2021-03-31 DIAGNOSIS — M79.89 LEG SWELLING: ICD-10-CM

## 2021-03-31 DIAGNOSIS — S81.801D OPEN WOUND OF RIGHT LOWER LEG, SUBSEQUENT ENCOUNTER: Primary | ICD-10-CM

## 2021-03-31 PROCEDURE — 99212 OFFICE O/P EST SF 10 MIN: CPT | Performed by: NURSE PRACTITIONER

## 2021-03-31 PROCEDURE — 97597 DBRDMT OPN WND 1ST 20 CM/<: CPT | Performed by: NURSE PRACTITIONER

## 2021-03-31 ASSESSMENT — ENCOUNTER SYMPTOMS: COLOR CHANGE: 1

## 2021-03-31 NOTE — PATIENT INSTRUCTIONS
Return in about 1 week (around 4/7/2021) for right shin wound. Apply Silvadene Cream to wound bed, cover with dry gauze dressing. Wear Spandagrip daily to decrease amount of swelling in lower leg. PATIENT EDUCATION focused on the need for compression. We are applying a Spandagrip on her RLE. It improves blood flow in the leg, prevents blood clotting and inhibits the progression of a variety of venous disorders. The Spandagrip helps squeeze the venous blood back up toward the heart, to enhance circulation.

## 2021-03-31 NOTE — PROGRESS NOTES
Subjective:      Patient ID: Stan Jernigan is a 78 y.o. female. Chief Complaint   Patient presents with    Wound Check     One week FU on right shin wound/leg swelling    Leg Swelling       Pain Assessment  The patient is currently not experiencing any pain at this time. Wound Check    The symptoms first began on 11/23/2020, when the patient fell. She is on Coumadin and suffered a hematoma on her right shin. The hematoma was evacuated 12/3/2020. The patient is currently not experiencing any pain. She reports associated symptoms of leg swelling and draining clear fluid. Current treatment includes Karoline moistened with NS, gauze dressing and an ACE wrap. She presently has [facility] doing dressing changes. Review of Systems   Constitutional: Negative for chills and fever. Cardiovascular: Positive for leg swelling. Skin: Positive for color change and wound (right shin). Hematological: Bruises/bleeds easily (on Coumadin). All other systems reviewed and are negative. Allergies   Allergen Reactions    Adhesive Tape      rash       Prior to Visit Medications    Medication Sig Taking? Authorizing Provider   silver sulfADIAZINE (SILVADENE) 1 % cream Apply topically to right shin wound twice weekly.   Payal Arroyo, APRN - CNP   hydroCHLOROthiazide (HYDRODIURIL) 25 MG tablet Take 25 mg by mouth daily  Historical Provider, MD   potassium chloride (KLOR-CON M20) 20 MEQ extended release tablet Take 20 mEq by mouth 2 times daily  Historical Provider, MD   furosemide (LASIX) 20 MG tablet Take 20 mg by mouth 2 times daily  Historical Provider, MD   famotidine (PEPCID) 40 MG tablet Take 40 mg by mouth daily  Historical Provider, MD   warfarin (COUMADIN) 5 MG tablet Take 5 mg by mouth daily Indications: 6 days pwer week 2.5 1 day  Historical Provider, MD   metoprolol succinate (TOPROL XL) 50 MG extended release tablet Take 100 mg by mouth 2 times daily   Historical Provider, MD   Calcium Carb-Cholecalciferol (CALTRATE 600+D) 600-800 MG-UNIT TABS Take by mouth  Historical Provider, MD   Multiple Vitamins-Minerals (THERAPEUTIC MULTIVITAMIN-MINERALS) tablet Take 1 tablet by mouth daily  Historical Provider, MD   Fexofenadine HCl (ALLEGRA ALLERGY PO) Take by mouth  Historical Provider, MD   betamethasone valerate (VALISONE) 0.1 % cream Apply topically 2 times daily Apply topically 2 times daily. Historical Provider, MD       History reviewed. Objective:   Physical Exam  Vitals signs and nursing note reviewed. Constitutional:       Appearance: Normal appearance. HENT:      Head: Normocephalic and atraumatic. Nose: Nose normal.      Mouth/Throat:      Pharynx: Oropharynx is clear. Eyes:      Conjunctiva/sclera: Conjunctivae normal.   Neck:      Musculoskeletal: Normal range of motion. Cardiovascular:      Pulses: Normal pulses. Comments:   GEOVANY'S 12/3/2020  Right:  P.T.: 200+ D.P.:200+ ARM BP: N/A      P.I.:WNO        Left:  P.T.:200+ D.P.: 200+ ARM BP: 156/86   P. I.: Darolyn Rossiter    Pulmonary:      Effort: Pulmonary effort is normal.   Musculoskeletal:      Right lower leg: Edema (+2 pitting edema) present. Left lower leg: Left lower leg edema: nonpitting edema. Skin:     General: Skin is warm and dry. Capillary Refill: Capillary refill takes less than 2 seconds. Comments: Hemosiderin discoloration bilaterally    Right shin ulcer measures 1.2 cm x 0.5 cm x 0.1 cm   Neurological:      General: No focal deficit present. Mental Status: She is alert and oriented to person, place, and time. Cranial Nerves: No cranial nerve deficit. Sensory: No sensory deficit. Motor: No weakness. Coordination: Coordination normal.      Gait: Gait (use of wheelchair) normal.   Psychiatric:         Mood and Affect: Mood normal.         Behavior: Behavior normal.         Thought Content:  Thought content normal.         Judgment: Judgment normal.         Assessment: Diagnosis   1. Open wound of right lower leg, subsequent encounter    2. Leg swelling        Using a curette, debrided epidermis and dermis of wound, located right shin. Dimensions of wound are 1.2 cm x 0.5 cm. Dressed area with Silvadene Cream, gauze dressing, followed by Spandagrip. No immediate complications observed. Call office if signs of infection or fever, excessive swelling or pain occur. Apply Silvadene Cream to wound bed, cover with dry gauze dressing. Wear Spandagrip daily to decrease amount of swelling in lower leg. PATIENT EDUCATION focused on the need for compression. We are applying a Spandagrip on her RLE. It improves blood flow in the leg, prevents blood clotting and inhibits the progression of a variety of venous disorders. The Spandagrip helps squeeze the venous blood back up toward the heart, to enhance circulation. Plan:          Return in about 1 week (around 4/7/2021) for right shin wound.

## 2021-04-07 ENCOUNTER — OFFICE VISIT (OUTPATIENT)
Dept: SURGERY | Age: 81
End: 2021-04-07
Payer: MEDICARE

## 2021-04-07 VITALS
BODY MASS INDEX: 32.39 KG/M2 | WEIGHT: 165 LBS | DIASTOLIC BLOOD PRESSURE: 89 MMHG | HEART RATE: 83 BPM | SYSTOLIC BLOOD PRESSURE: 135 MMHG | HEIGHT: 60 IN

## 2021-04-07 DIAGNOSIS — S81.801D OPEN WOUND OF RIGHT LOWER LEG, SUBSEQUENT ENCOUNTER: Primary | ICD-10-CM

## 2021-04-07 DIAGNOSIS — M79.89 LEG SWELLING: ICD-10-CM

## 2021-04-07 PROCEDURE — 97597 DBRDMT OPN WND 1ST 20 CM/<: CPT | Performed by: NURSE PRACTITIONER

## 2021-04-07 ASSESSMENT — ENCOUNTER SYMPTOMS: COLOR CHANGE: 1

## 2021-04-07 NOTE — PROGRESS NOTES
Subjective:      Patient ID: Olivia Mills is a 78 y.o. female. Chief Complaint   Patient presents with    Wound Check     Pt is here today for a 1 week followup visit. Pain Assessment  The patient is currently not experiencing any pain at this time. Wound Check    The symptoms first began on 11/23/2020, when the patient fell. She is on Coumadin and suffered a hematoma on her right shin. The hematoma was evacuated 12/3/2020. The patient is currently not experiencing any pain. She reports associated symptoms of leg swelling and draining clear fluid. Current treatment includes Karoline moistened with NS, gauze dressing and an ACE wrap. She presently has [facility] doing dressing changes. Review of Systems   Constitutional: Negative for chills and fever. Cardiovascular: Positive for leg swelling. Skin: Positive for color change and wound (right shin). Hematological: Bruises/bleeds easily (on Coumadin). All other systems reviewed and are negative. Allergies   Allergen Reactions    Adhesive Tape      rash         Prior to Visit Medications    Medication Sig Taking? Authorizing Provider   silver sulfADIAZINE (SILVADENE) 1 % cream Apply topically to right shin wound twice weekly.  Yes Arnol Tejeda APRN - [facility]   hydroCHLOROthiazide (HYDRODIURIL) 25 MG tablet Take 25 mg by mouth daily Yes Historical Provider, MD   potassium chloride (KLOR-CON M20) 20 MEQ extended release tablet Take 20 mEq by mouth 2 times daily Yes Historical Provider, MD   furosemide (LASIX) 20 MG tablet Take 20 mg by mouth 2 times daily Yes Historical Provider, MD   famotidine (PEPCID) 40 MG tablet Take 40 mg by mouth daily Yes Historical Provider, MD   warfarin (COUMADIN) 5 MG tablet Take 5 mg by mouth daily Indications: 6 days pwer week 2.5 1 day Yes Historical Provider, MD   metoprolol succinate (TOPROL XL) 50 MG extended release tablet Take 100 mg by mouth 2 times daily  Yes Historical Provider, MD Calcium Carb-Cholecalciferol (CALTRATE 600+D) 600-800 MG-UNIT TABS Take by mouth Yes Historical Provider, MD   Multiple Vitamins-Minerals (THERAPEUTIC MULTIVITAMIN-MINERALS) tablet Take 1 tablet by mouth daily Yes Historical Provider, MD   Fexofenadine HCl (ALLEGRA ALLERGY PO) Take by mouth Yes Historical Provider, MD   betamethasone valerate (VALISONE) 0.1 % cream Apply topically 2 times daily Apply topically 2 times daily. Yes Historical Provider, MD       History reviewed. Objective:   Physical Exam  Vitals signs and nursing note reviewed. Constitutional:       Appearance: Normal appearance. HENT:      Head: Normocephalic and atraumatic. Nose: Nose normal.      Mouth/Throat:      Pharynx: Oropharynx is clear. Eyes:      Conjunctiva/sclera: Conjunctivae normal.   Neck:      Musculoskeletal: Normal range of motion. Cardiovascular:      Pulses: Normal pulses. Comments:   GEOVANY'S 12/3/2020  Right:  P.T.: 200+ D.P.:200+ ARM BP: N/A      P.I.:WNO        Left:  P.T.:200+ D.P.: 200+ ARM BP: 156/86   P. I.: [facility] [place]    Pulmonary:      Effort: Pulmonary effort is normal.   Musculoskeletal:      Right lower leg: Edema (+2 pitting edema) present. Left lower leg: Left lower leg edema: nonpitting edema. Skin:     General: Skin is warm and dry. Capillary Refill: Capillary refill takes less than 2 seconds. Comments: Hemosiderin discoloration bilaterally    Right shin ulcer measures 0.7 cm x 0.3 cm x 0.1 cm   Neurological:      General: No focal deficit present. Mental Status: She is alert and oriented to person, place, and time. Cranial Nerves: No cranial nerve deficit. Sensory: No sensory deficit. Motor: No weakness. Coordination: Coordination normal.   Psychiatric:         Mood and Affect: Mood normal.         Behavior: Behavior normal.         Thought Content: Thought content normal.         Judgment: Judgment normal.         Assessment:      Diagnosis   1.  Open

## 2021-04-07 NOTE — PATIENT INSTRUCTIONS
Return in about 1 week (around 4/14/2021) for right shin wound. Apply Silvadene Cream to wound bed, cover with dry gauze dressing.   Wear Spandagrip daily to decrease amount of swelling in lower leg.     PATIENT EDUCATION focused on the need for compression.  We are applying a Spandagrip on her RLE.  It improves blood flow in the leg, prevents blood clotting and inhibits the progression of a variety of venous disorders.  The Spandagrip helps squeeze the venous blood back up toward the heart, to enhance circulation.        Wound measures 0.7 cm x 0.3 cm

## 2021-04-12 ENCOUNTER — TELEPHONE (OUTPATIENT)
Dept: SURGERY | Age: 81
End: 2021-04-12

## 2021-04-14 ENCOUNTER — OFFICE VISIT (OUTPATIENT)
Dept: SURGERY | Age: 81
End: 2021-04-14
Payer: MEDICARE

## 2021-04-14 VITALS
BODY MASS INDEX: 32.39 KG/M2 | HEIGHT: 60 IN | WEIGHT: 165 LBS | DIASTOLIC BLOOD PRESSURE: 90 MMHG | SYSTOLIC BLOOD PRESSURE: 143 MMHG

## 2021-04-14 DIAGNOSIS — S81.801D OPEN WOUND OF RIGHT LOWER LEG, SUBSEQUENT ENCOUNTER: Primary | ICD-10-CM

## 2021-04-14 DIAGNOSIS — M79.89 LEG SWELLING: ICD-10-CM

## 2021-04-14 PROCEDURE — 29580 STRAPPING UNNA BOOT: CPT | Performed by: NURSE PRACTITIONER

## 2021-04-14 ASSESSMENT — ENCOUNTER SYMPTOMS: COLOR CHANGE: 1

## 2021-04-14 NOTE — PATIENT INSTRUCTIONS
Return in about 1 week (around 4/21/2021) for right lower extremity ulcers/leg swelling . PATIENT EDUCATION focused on need for Unna boot therapy. Unna boot is a gauze dressing consisting of zinc or calamine paste. It supports vascular problems, helps with healing leg ulcers, swelling or lymphedema by providing compression to the lower legs. The dressing can be worn up to a week before it needs changed. Patient must keep the Unna boot dry.         Right ankle measures 22.5 cm  Right calf measures 33 cm    New wound 3 cm x 1.9 cm  Initial wound 2 mm x 2 mm

## 2021-04-14 NOTE — PROGRESS NOTES
Subjective:      Patient ID: Анна Reis is a 78 y.o. female. Chief Complaint   Patient presents with    Follow-up     Pt is here today for a wound check. Pain Assessment  The patient is currently not experiencing any pain at this time. Wound Check    The wound we have been treating began on 11/23/2020, when the patient fell and suffered a hematoma on her right shin. The hematoma was evacuated 12/3/2020 and the wound is almost completely healed. Now she has developed a new wound. She reports that she does not know how it started, but given her leg swelling and hemosiderin staining, it appears to be venous. I explained the use of Unna boots and she agreed to proceed. She is currently not experiencing any pain. She reports associated symptoms of leg swelling and draining clear fluid. She presently has [facility] doing dressing changes. Review of Systems   Constitutional: Negative for chills and fever. Cardiovascular: Positive for leg swelling. Skin: Positive for color change and wound (right shin). Hematological: Bruises/bleeds easily (on Coumadin). All other systems reviewed and are negative. Allergies   Allergen Reactions    Adhesive Tape      rash         Prior to Visit Medications    Medication Sig Taking? Authorizing Provider   silver sulfADIAZINE (SILVADENE) 1 % cream Apply topically to right shin wound twice weekly.  Yes Chip Jack, APRN - CNP   hydroCHLOROthiazide (HYDRODIURIL) 25 MG tablet Take 25 mg by mouth daily Yes Historical Provider, MD   potassium chloride (KLOR-CON M20) 20 MEQ extended release tablet Take 20 mEq by mouth 2 times daily Yes Historical Provider, MD   furosemide (LASIX) 20 MG tablet Take 20 mg by mouth 2 times daily Yes Historical Provider, MD   famotidine (PEPCID) 40 MG tablet Take 40 mg by mouth daily Yes Historical Provider, MD   warfarin (COUMADIN) 5 MG tablet Take 5 mg by mouth daily Indications: 6 days pwer week 2.5 1 day Yes Historical Provider, MD   metoprolol succinate (TOPROL XL) 50 MG extended release tablet Take 100 mg by mouth 2 times daily  Yes Historical Provider, MD   Calcium Carb-Cholecalciferol (CALTRATE 600+D) 600-800 MG-UNIT TABS Take by mouth Yes Historical Provider, MD   Multiple Vitamins-Minerals (THERAPEUTIC MULTIVITAMIN-MINERALS) tablet Take 1 tablet by mouth daily Yes Historical Provider, MD   Fexofenadine HCl (ALLEGRA ALLERGY PO) Take by mouth Yes Historical Provider, MD   betamethasone valerate (VALISONE) 0.1 % cream Apply topically 2 times daily Apply topically 2 times daily. Yes Historical Provider, MD       History reviewed. Objective:   Physical Exam  Vitals signs and nursing note reviewed. Constitutional:       Appearance: Normal appearance. HENT:      Head: Normocephalic and atraumatic. Nose: Nose normal.      Mouth/Throat:      Pharynx: Oropharynx is clear. Eyes:      Conjunctiva/sclera: Conjunctivae normal.   Neck:      Musculoskeletal: Normal range of motion. Cardiovascular:      Pulses: Normal pulses. Comments:   GEOVANY'S 12/3/2020  Right:  P.T.: 200+ D.P.:200+ ARM BP: N/A      P.I.:WNO        Left:  P.T.:200+ D.P.: 200+ ARM BP: 156/86   P. I.: Renee Beebe    Pulmonary:      Effort: Pulmonary effort is normal.   Musculoskeletal:      Right lower leg: Edema (+2 pitting edema; right ankle measures 22.5 cm, calf 33 cm) present. Left lower leg: Left lower leg edema: nonpitting edema. Skin:     General: Skin is warm and dry. Capillary Refill: Capillary refill takes less than 2 seconds. Comments: Hemosiderin discoloration bilaterally    Initial open wound of right shin ulcer measures 0.2 cm x 0.2 cm x 0.1 cm    New wound right lateral shin measures 3 cm x 1.9 cm x 0.1 cm   Neurological:      General: No focal deficit present. Mental Status: She is alert and oriented to person, place, and time. Cranial Nerves: No cranial nerve deficit. Sensory: No sensory deficit. Motor: No weakness. Coordination: Coordination normal.   Psychiatric:         Mood and Affect: Mood normal.         Behavior: Behavior normal.         Thought Content: Thought content normal.         Judgment: Judgment normal.         Assessment:      Diagnosis   1. Open wound of right lower leg, subsequent encounter    2. Leg swelling        Unna boot applied to RLE (Silvadene Cream to wound bed, Betamethasone to periwound). PATIENT EDUCATION focused on need for Unna boot therapy. Unna boot is a gauze dressing consisting of zinc or calamine paste. It supports vascular problems, helps with healing leg ulcers, swelling or lymphedema by providing compression to the lower legs. The dressing can be worn up to a week before it needs changed. Patient must keep the Unna boot dry. Plan:        Teddy Zimmerman, [facility], at [phone], to discuss use of CoFlex and give him verbal order. Return in about 1 week (around 4/21/2021) for right lower extremity ulcers/leg swelling .

## 2021-04-16 ENCOUNTER — OFFICE VISIT (OUTPATIENT)
Dept: SURGERY | Age: 81
End: 2021-04-16
Payer: MEDICARE

## 2021-04-16 ENCOUNTER — TELEPHONE (OUTPATIENT)
Dept: SURGERY | Age: 81
End: 2021-04-16

## 2021-04-16 VITALS
SYSTOLIC BLOOD PRESSURE: 137 MMHG | DIASTOLIC BLOOD PRESSURE: 79 MMHG | HEART RATE: 80 BPM | WEIGHT: 165 LBS | BODY MASS INDEX: 32.22 KG/M2

## 2021-04-16 DIAGNOSIS — M79.89 LEG SWELLING: ICD-10-CM

## 2021-04-16 DIAGNOSIS — S81.801D OPEN WOUND OF RIGHT LOWER LEG, SUBSEQUENT ENCOUNTER: Primary | ICD-10-CM

## 2021-04-16 PROCEDURE — 29580 STRAPPING UNNA BOOT: CPT | Performed by: NURSE PRACTITIONER

## 2021-04-16 ASSESSMENT — ENCOUNTER SYMPTOMS: COLOR CHANGE: 1

## 2021-04-16 NOTE — PROGRESS NOTES
mouth 2 times daily  Historical Provider, MD   famotidine (PEPCID) 40 MG tablet Take 40 mg by mouth daily  Historical Provider, MD   warfarin (COUMADIN) 5 MG tablet Take 5 mg by mouth daily Indications: 6 days pwer week 2.5 1 day  Historical Provider, MD   metoprolol succinate (TOPROL XL) 50 MG extended release tablet Take 100 mg by mouth 2 times daily   Historical Provider, MD   Calcium Carb-Cholecalciferol (CALTRATE 600+D) 600-800 MG-UNIT TABS Take by mouth  Historical Provider, MD   Multiple Vitamins-Minerals (THERAPEUTIC MULTIVITAMIN-MINERALS) tablet Take 1 tablet by mouth daily  Historical Provider, MD   Fexofenadine HCl (ALLEGRA ALLERGY PO) Take by mouth  Historical Provider, MD   betamethasone valerate (VALISONE) 0.1 % cream Apply topically 2 times daily Apply topically 2 times daily. Historical Provider, MD       History reviewed. Objective:   Physical Exam  Vitals signs and nursing note reviewed. Constitutional:       Appearance: Normal appearance. HENT:      Head: Normocephalic and atraumatic. Nose: Nose normal.      Mouth/Throat:      Pharynx: Oropharynx is clear. Eyes:      Conjunctiva/sclera: Conjunctivae normal.   Neck:      Musculoskeletal: Normal range of motion. Cardiovascular:      Pulses: Normal pulses. Comments:   [facility] 12/3/2020  Right:  P.T.: 200+ D.P.:200+ ARM BP: N/A      P.I.:WNO        Left:  P.T.:200+ D.P.: 200+ ARM BP: 156/86   P. I.: Kiko Sees    Pulmonary:      Effort: Pulmonary effort is normal.   Musculoskeletal:      Right lower leg: Edema (+1 pitting edema; right ankle measures 21.4 cm, calf 32.4 cm) present. Left lower leg: Left lower leg edema: nonpitting edema. Skin:     General: Skin is warm and dry. Capillary Refill: Capillary refill takes less than 2 seconds.              Comments: Hemosiderin discoloration bilaterally    Initial open wound of right shin ulcer measures healed quite nice    New wound right lateral shin measures 2.8 cm x 1.4cm Neurological:      General: No focal deficit present. Mental Status: She is alert and oriented to person, place, and time. Cranial Nerves: No cranial nerve deficit. Sensory: No sensory deficit. Motor: No weakness. Coordination: Coordination normal.   Psychiatric:         Mood and Affect: Mood normal.         Behavior: Behavior normal.         Thought Content: Thought content normal.         Judgment: Judgment normal.         Assessment:      Diagnosis   1. Open wound of right lower leg, subsequent encounter    2. Leg swelling        Unna boot applied to RLE (Silvadene Cream to wound bed, Betamethasone to periwound, silver AG). PATIENT EDUCATION focused on need for Unna boot therapy. Unna boot is a gauze dressing consisting of zinc or calamine paste. It supports vascular problems, helps with healing leg ulcers, swelling or lymphedema by providing compression to the lower legs. The dressing can be worn up to a week before it needs changed. Patient must keep the Unna boot dry. Plan:       Return in about 1 week (around 4/23/2021) for Right lower extremity ulcers/ leg swelling .

## 2021-04-22 ENCOUNTER — OFFICE VISIT (OUTPATIENT)
Dept: SURGERY | Age: 81
End: 2021-04-22
Payer: MEDICARE

## 2021-04-22 VITALS — SYSTOLIC BLOOD PRESSURE: 152 MMHG | DIASTOLIC BLOOD PRESSURE: 90 MMHG | HEART RATE: 83 BPM

## 2021-04-22 DIAGNOSIS — S81.801D OPEN WOUND OF RIGHT LOWER LEG, SUBSEQUENT ENCOUNTER: Primary | ICD-10-CM

## 2021-04-22 DIAGNOSIS — M79.89 LEG SWELLING: ICD-10-CM

## 2021-04-22 PROCEDURE — 29580 STRAPPING UNNA BOOT: CPT | Performed by: NURSE PRACTITIONER

## 2021-04-22 ASSESSMENT — ENCOUNTER SYMPTOMS: COLOR CHANGE: 1

## 2021-04-22 NOTE — PATIENT INSTRUCTIONS
PATIENT EDUCATION focused on need for Unna boot therapy. Unna boot is a gauze dressing consisting of zinc or calamine paste. It supports vascular problems, helps with healing leg ulcers, swelling or lymphedema by providing compression to the lower legs. The dressing can be worn up to a week before it needs changed. Patient must keep the Unna boot dry. Return in one week for right lower extremity wound check and unna boot change.

## 2021-04-22 NOTE — PROGRESS NOTES
Subjective:      Patient ID: Katiana Trimble is a 78 y.o. female. Chief Complaint   Patient presents with    Skin Ulcer     Right lower extremity wound/leg swelling and to have Unna boot changed    Leg Swelling       Pain Assessment  The patient is currently not experiencing any pain at this time. Wound Check    The ulcer is located in the right lateral calf. The symptoms first began days ago. The patient is currently not experiencing any pain. The patient reports associated symptoms of drainage of clear fluid and swelling. The ulcer is aggravated by local pressure and prolonged sitting. Per the patient the ulcer is not recurrent. Relevant past surgeries include none. Treatment so far includes: Silvadene Cream to ulcer, gauze dressing and Unna boot. The patient has not undergone any diagnostic testing as yet. She presently has [facility] doing dressing changes. Review of Systems   Constitutional: Negative for chills and fever. Cardiovascular: Positive for leg swelling. Skin: Positive for color change and wound (right lateral calf). Hematological: Bruises/bleeds easily (on Coumadin). All other systems reviewed and are negative. Allergies   Allergen Reactions    Adhesive Tape      rash         Prior to Visit Medications    Medication Sig Taking? Authorizing Provider   silver sulfADIAZINE (SILVADENE) 1 % cream Apply topically to right shin wound twice weekly.   Horace Aceves APRN - [facility]   hydroCHLOROthiazide (HYDRODIURIL) 25 MG tablet Take 25 mg by mouth daily  Historical Provider, MD   potassium chloride (KLOR-CON M20) 20 MEQ extended release tablet Take 20 mEq by mouth 2 times daily  Historical Provider, MD   furosemide (LASIX) 20 MG tablet Take 20 mg by mouth 2 times daily  Historical Provider, MD   famotidine (PEPCID) 40 MG tablet Take 40 mg by mouth daily  Historical Provider, MD   warfarin (COUMADIN) 5 MG tablet Take 5 mg by mouth daily Indications: 6 days pwer week 2.5 1 day  Historical Provider, MD   metoprolol succinate (TOPROL XL) 50 MG extended release tablet Take 100 mg by mouth 2 times daily   Historical Provider, MD   Calcium Carb-Cholecalciferol (CALTRATE 600+D) 600-800 MG-UNIT TABS Take by mouth  Historical Provider, MD   Multiple Vitamins-Minerals (THERAPEUTIC MULTIVITAMIN-MINERALS) tablet Take 1 tablet by mouth daily  Historical Provider, MD   Fexofenadine HCl (ALLEGRA ALLERGY PO) Take by mouth  Historical Provider, MD   betamethasone valerate (VALISONE) 0.1 % cream Apply topically 2 times daily Apply topically 2 times daily. Historical Provider, MD       History reviewed. Objective:   Physical Exam  Vitals signs and nursing note reviewed. Constitutional:       Appearance: Normal appearance. HENT:      Head: Normocephalic and atraumatic. Nose: Nose normal.      Mouth/Throat:      Pharynx: Oropharynx is clear. Eyes:      Conjunctiva/sclera: Conjunctivae normal.   Neck:      Musculoskeletal: Normal range of motion. Cardiovascular:      Pulses: Normal pulses. Comments:   [facility] 12/3/2020  Right:  P.T.: 200+ D.P.:200+ ARM BP: N/A      P.I.:WNO        Left:  P.T.:200+ D.P.: 200+ ARM BP: 156/86   P. I.: Sharmon Means    Pulmonary:      Effort: Pulmonary effort is normal.   Musculoskeletal:      Right lower leg: Edema (nonpitting edema; right ankle measures 21.6 cm, calf 31.2 cm) present. Left lower leg: Left lower leg edema: nonpitting edema. Skin:     General: Skin is warm and dry. Capillary Refill: Capillary refill takes less than 2 seconds. Comments: Hemosiderin discoloration bilaterally    Right lateral shin wound measures   2.6 cm x 1.1 cm x 0.1 cm   Neurological:      General: No focal deficit present. Mental Status: She is alert and oriented to person, place, and time. Cranial Nerves: No cranial nerve deficit. Sensory: No sensory deficit. Motor: No weakness.       Coordination: Coordination normal.   Psychiatric:

## 2021-04-29 ENCOUNTER — OFFICE VISIT (OUTPATIENT)
Dept: SURGERY | Age: 81
End: 2021-04-29
Payer: MEDICARE

## 2021-04-29 VITALS — HEART RATE: 78 BPM | DIASTOLIC BLOOD PRESSURE: 87 MMHG | SYSTOLIC BLOOD PRESSURE: 149 MMHG

## 2021-04-29 DIAGNOSIS — M79.89 LEG SWELLING: ICD-10-CM

## 2021-04-29 DIAGNOSIS — S81.801D OPEN WOUND OF RIGHT LOWER LEG, SUBSEQUENT ENCOUNTER: Primary | ICD-10-CM

## 2021-04-29 PROCEDURE — 97597 DBRDMT OPN WND 1ST 20 CM/<: CPT | Performed by: NURSE PRACTITIONER

## 2021-04-29 ASSESSMENT — ENCOUNTER SYMPTOMS: COLOR CHANGE: 1

## 2021-04-29 NOTE — PROGRESS NOTES
Subjective:      Patient ID: Bobby Mercedes is a 78 y.o. female. Chief Complaint   Patient presents with    Wound Check     One week FU on RLE wound/leg swelling and to have Unna boot changed    Leg Swelling       Pain Assessment  The patient is currently not experiencing any pain at this time. Wound Check    The ulcer is located in the right lateral shin. The symptoms first began days ago. The patient is currently not experiencing any pain. The patient reports associated symptoms of drainage of clear fluid and swelling. The ulcer is aggravated by local pressure and prolonged sitting. Per the patient the ulcer is not recurrent. Relevant past surgeries include none. Treatment so far includes: Silvadene Cream to ulcer, gauze dressing and Unna boot. The patient has not undergone any diagnostic testing as yet. She presently has [facility] doing an Unna boot change 1X weekly. Review of Systems   Constitutional: Negative for chills and fever. Cardiovascular: Positive for leg swelling. Skin: Positive for color change and wound (right lateral shin). Hematological: Bruises/bleeds easily (on Coumadin). All other systems reviewed and are negative. Allergies   Allergen Reactions    Adhesive Tape      rash         Prior to Visit Medications    Medication Sig Taking? Authorizing Provider   silver sulfADIAZINE (SILVADENE) 1 % cream Apply topically to right shin wound twice weekly.  Yes OZZIE Flanagan CNP   hydroCHLOROthiazide (HYDRODIURIL) 25 MG tablet Take 25 mg by mouth daily Yes Historical Provider, MD   potassium chloride (KLOR-CON M20) 20 MEQ extended release tablet Take 20 mEq by mouth 2 times daily Yes Historical Provider, MD   furosemide (LASIX) 20 MG tablet Take 20 mg by mouth 2 times daily Yes Historical Provider, MD   famotidine (PEPCID) 40 MG tablet Take 40 mg by mouth daily Yes Historical Provider, MD   warfarin (COUMADIN) 5 MG tablet Take 5 mg by mouth daily Indications: 6 days pwer week 2.5 1 day Yes Historical Provider, MD   metoprolol succinate (TOPROL XL) 50 MG extended release tablet Take 100 mg by mouth 2 times daily  Yes Historical Provider, MD   Calcium Carb-Cholecalciferol (CALTRATE 600+D) 600-800 MG-UNIT TABS Take by mouth Yes Historical Provider, MD   Multiple Vitamins-Minerals (THERAPEUTIC MULTIVITAMIN-MINERALS) tablet Take 1 tablet by mouth daily Yes Historical Provider, MD   Fexofenadine HCl (ALLEGRA ALLERGY PO) Take by mouth Yes Historical Provider, MD   betamethasone valerate (VALISONE) 0.1 % cream Apply topically 2 times daily Apply topically 2 times daily. Yes Historical Provider, MD       History reviewed. Objective:   Physical Exam  Vitals signs and nursing note reviewed. Constitutional:       Appearance: Normal appearance. HENT:      Head: Normocephalic and atraumatic. Nose: Nose normal.      Mouth/Throat:      Pharynx: Oropharynx is clear. Eyes:      Conjunctiva/sclera: Conjunctivae normal.   Neck:      Musculoskeletal: Normal range of motion. Cardiovascular:      Pulses: Normal pulses. Comments:   [facility] 12/3/2020  Right:  P.T.: 200+ D.P.:200+ ARM BP: N/A      P.I.:WNO        Left:  P.T.:200+ D.P.: 200+ ARM BP: 156/86   P. I.: Melanie Garcia    Pulmonary:      Effort: Pulmonary effort is normal.   Musculoskeletal:      Right lower leg: Edema (nonpitting edema; controlled with Unna boot) present. Left lower leg: Left lower leg edema: nonpitting edema; controlled with compression stocking. Skin:     General: Skin is warm and dry. Capillary Refill: Capillary refill takes less than 2 seconds. Comments: Hemosiderin discoloration bilaterally    Right lateral shin wound measures   1.5 cm x 0.7 cm x 0.1 cm   Neurological:      General: No focal deficit present. Mental Status: She is alert and oriented to person, place, and time. Cranial Nerves: No cranial nerve deficit. Sensory: No sensory deficit. Motor: No weakness. Coordination: Coordination normal.   Psychiatric:         Mood and Affect: Mood normal.         Behavior: Behavior normal.         Thought Content: Thought content normal.         Judgment: Judgment normal.         Assessment:      Diagnosis   1. Open wound of right lower leg, subsequent encounter    2. Leg swelling      Using a curette, debrided surface area of wound (epidermis and dermis), located right lateral shin. Anesthetic was not indicated; she is sensitive to it. Dimensions of wound are 1.5 cm x 0.7 cm. Dressed area with Silvadene Cream, gauze dressing and CoFlex Unna boot. No immediate complications observed. Call office if signs of infection or fever, excessive swelling or pain occur. Unna boot applied to RLE (Silvadene Cream to wound bed)    PATIENT EDUCATION focused on need for [employer] therapy. Unna boot is a gauze dressing consisting of zinc or calamine paste. It supports vascular problems, helps with healing leg ulcers, swelling or lymphedema by providing compression to the lower legs. The dressing can be worn up to a week before it needs changed. Patient must keep the Unna boot dry. Plan:       Return in about 1 week (around 5/6/2021) for right leg wound check and unna boot change.

## 2021-04-29 NOTE — PATIENT INSTRUCTIONS
Unna boot applied to RLE (Silvadene Cream to wound bed)     PATIENT EDUCATION focused on need for [employer] therapy. Unna boot is a gauze dressing consisting of zinc or calamine paste. It supports vascular problems, helps with healing leg ulcers, swelling or lymphedema by providing compression to the lower legs. The dressing can be worn up to a week before it needs changed. Patient must keep the Unna boot dry.       Return in one week for a right lower extremity wound check and unna boot change

## 2021-05-06 ENCOUNTER — OFFICE VISIT (OUTPATIENT)
Dept: SURGERY | Age: 81
End: 2021-05-06
Payer: MEDICARE

## 2021-05-06 VITALS — HEART RATE: 83 BPM | DIASTOLIC BLOOD PRESSURE: 83 MMHG | SYSTOLIC BLOOD PRESSURE: 130 MMHG

## 2021-05-06 DIAGNOSIS — S81.801D OPEN WOUND OF RIGHT LOWER LEG, SUBSEQUENT ENCOUNTER: Primary | ICD-10-CM

## 2021-05-06 DIAGNOSIS — M79.89 LEG SWELLING: ICD-10-CM

## 2021-05-06 PROCEDURE — 29580 STRAPPING UNNA BOOT: CPT | Performed by: NURSE PRACTITIONER

## 2021-05-06 ASSESSMENT — ENCOUNTER SYMPTOMS: COLOR CHANGE: 1

## 2021-05-06 NOTE — PATIENT INSTRUCTIONS
Your right lower extremity appears to be healed, although the skin layer is thin. A new unna boot will be applied, and when wound care comes to change the boot, have them clean gently. At that time you may not need another boot, but will need to wear your compression stockings. Return as needed.

## 2021-05-06 NOTE — PROGRESS NOTES
Subjective:      Patient ID: Rene Solomon is a 78 y.o. female. Chief Complaint   Patient presents with    Skin Ulcer     One week FU on RLE wound/leg swelling and to have Unna boot changed    Leg Swelling       Pain Assessment  The patient is currently not experiencing any pain at this time. Wound Check    The ulcer is located in the right lateral shin. The symptoms first began days ago. The patient is currently not experiencing any pain. The patient reports associated symptoms of  swelling. The ulcer is aggravated by local pressure and prolonged sitting. Per the patient the ulcer is not recurrent. Relevant past surgeries include none. Treatment so far includes: Silvadene Cream to ulcer, gauze dressing and Unna boot. The patient has not undergone any diagnostic testing as yet. She presently has [facility] doing an Unna boot change 1X weekly. Review of Systems   Constitutional: Negative for chills and fever. Cardiovascular: Positive for leg swelling. Skin: Positive for color change and wound (right lateral shin). Hematological: Bruises/bleeds easily (on Coumadin). All other systems reviewed and are negative. Allergies   Allergen Reactions    Adhesive Tape      rash         Prior to Visit Medications    Medication Sig Taking? Authorizing Provider   silver sulfADIAZINE (SILVADENE) 1 % cream Apply topically to right shin wound twice weekly.  Yes Cricket Gil, APRN - CNP   hydroCHLOROthiazide (HYDRODIURIL) 25 MG tablet Take 25 mg by mouth daily Yes Historical Provider, MD   potassium chloride (KLOR-CON M20) 20 MEQ extended release tablet Take 20 mEq by mouth 2 times daily Yes Historical Provider, MD   furosemide (LASIX) 20 MG tablet Take 20 mg by mouth 2 times daily Yes Historical Provider, MD   famotidine (PEPCID) 40 MG tablet Take 40 mg by mouth daily Yes Historical Provider, MD   warfarin (COUMADIN) 5 MG tablet Take 5 mg by mouth daily Indications: 6 days pwer week 2.5 1 day Yes Historical Provider, MD   metoprolol succinate (TOPROL XL) 50 MG extended release tablet Take 100 mg by mouth 2 times daily  Yes Historical Provider, MD   Calcium Carb-Cholecalciferol (CALTRATE 600+D) 600-800 MG-UNIT TABS Take by mouth Yes Historical Provider, MD   Multiple Vitamins-Minerals (THERAPEUTIC MULTIVITAMIN-MINERALS) tablet Take 1 tablet by mouth daily Yes Historical Provider, MD   Fexofenadine HCl (ALLEGRA ALLERGY PO) Take by mouth Yes Historical Provider, MD   betamethasone valerate (VALISONE) 0.1 % cream Apply topically 2 times daily Apply topically 2 times daily. Yes Historical Provider, MD       History reviewed. Objective:   Physical Exam  Vitals signs and nursing note reviewed. Constitutional:       Appearance: Normal appearance. HENT:      Head: Normocephalic and atraumatic. Nose: Nose normal.      Mouth/Throat:      Pharynx: Oropharynx is clear. Eyes:      Conjunctiva/sclera: Conjunctivae normal.   Neck:      Musculoskeletal: Normal range of motion. Cardiovascular:      Pulses: Normal pulses. Comments:   [facility] 12/3/2020  Right:  P.T.: 200+ D.P.:200+ ARM BP: N/A      P.I.:WNO        Left:  P.T.:200+ D.P.: 200+ ARM BP: 156/86   P. I.: Faythe Oms    Pulmonary:      Effort: Pulmonary effort is normal.   Musculoskeletal:      Right lower leg: Edema (nonpitting edema; controlled with Unna boot) present. Left lower leg: Left lower leg edema: nonpitting edema; controlled with compression stocking. Skin:     General: Skin is warm and dry. Capillary Refill: Capillary refill takes less than 2 seconds. Comments: Hemosiderin discoloration bilaterally    Right lateral shin wound appears to have a thin layer of epithelium covering it   Neurological:      General: No focal deficit present. Mental Status: She is alert and oriented to person, place, and time. Cranial Nerves: No cranial nerve deficit. Sensory: No sensory deficit.       Motor: No weakness. Coordination: Coordination normal.   Psychiatric:         Mood and Affect: Mood normal.         Behavior: Behavior normal.         Thought Content: Thought content normal.         Judgment: Judgment normal.         Assessment:      Diagnosis   1. Open wound of right lower leg, subsequent encounter    2. Leg swelling        Unna boot applied to RLE     PATIENT EDUCATION focused on need for [employer] therapy. Unna boot is a gauze dressing consisting of zinc or calamine paste. It supports vascular problems, helps with healing leg ulcers, swelling or lymphedema by providing compression to the lower legs. The dressing can be worn up to a week before it needs changed. Patient must keep the Unna boot dry. Plan:       Patient plans to call after [facility] visit Monday to change Unna boot. If she is completely healed, she will not need another Unna boot, but rather a lady's elastic stocking under her compression stocking, 20-30 mmHg, (the elastic stocking is to protect her fragile skin from the risk of injury when she applies the compression stocking). Return if symptoms worsen or fail to improve.

## 2021-05-25 ENCOUNTER — TELEPHONE (OUTPATIENT)
Dept: SURGERY | Age: 81
End: 2021-05-25

## 2021-05-25 NOTE — TELEPHONE ENCOUNTER
Zia from home health called. He is excited to announce that Mrs. Weber wound is HEALED!! They will be discharging her from their care.

## 2021-10-25 ENCOUNTER — TELEPHONE (OUTPATIENT)
Dept: SURGERY | Age: 81
End: 2021-10-25

## 2022-01-13 ENCOUNTER — TELEPHONE (OUTPATIENT)
Dept: SURGERY | Age: 82
End: 2022-01-13

## 2022-01-13 NOTE — TELEPHONE ENCOUNTER
Discussed compression stockings. Her stockings may be getting worn out since she has developed more than 1 blister. The  last blister burst and has since healed. This blister seems under the skin, but she thinks there may be another one forming. She plans to purchase a new pair of compression stockings and she plans to elevate her legs when she is sitting. If she needs an appointment, she will call back.

## 2022-11-17 ENCOUNTER — OFFICE VISIT (OUTPATIENT)
Dept: VASCULAR SURGERY | Age: 82
End: 2022-11-17
Payer: MEDICARE

## 2022-11-17 VITALS
DIASTOLIC BLOOD PRESSURE: 70 MMHG | SYSTOLIC BLOOD PRESSURE: 126 MMHG | HEIGHT: 60 IN | BODY MASS INDEX: 29.84 KG/M2 | WEIGHT: 152 LBS

## 2022-11-17 DIAGNOSIS — S81.801A OPEN WOUND OF RIGHT LOWER LEG, INITIAL ENCOUNTER: Primary | ICD-10-CM

## 2022-11-17 DIAGNOSIS — M79.89 LEG SWELLING: ICD-10-CM

## 2022-11-17 PROCEDURE — 97597 DBRDMT OPN WND 1ST 20 CM/<: CPT | Performed by: NURSE PRACTITIONER

## 2022-11-17 ASSESSMENT — ENCOUNTER SYMPTOMS: COLOR CHANGE: 1

## 2022-11-17 NOTE — PATIENT INSTRUCTIONS
Return in 4 days (on 11/21/2022) for RLE ulcer/leg swelling and to have Unna boot changed. PATIENT EDUCATION focused on need for continued Unna boot therapy for compression. It supports vascular problems, helps to heal leg ulcers and controls leg swelling. The dressing can be worn up to a week before it needs changed. Patient must keep the Unna boot dry.

## 2022-11-17 NOTE — PROGRESS NOTES
Subjective:      Patient ID: Ivette Rodgers is a 78 y.o. female. Chief Complaint   Patient presents with    Wound Check    Leg Swelling     Here to have RLE ulcer/blister and leg swelling evaluated       Pain Assessment  The patient is currently not experiencing any pain at this time. Wound Check  The ulcer is located in the right shin. The symptoms first began days ago. The patient is currently not experiencing any pain. The patient reports associated symptoms of  swelling. The ulcer is aggravated by local pressure and prolonged sitting. Per the patient the ulcer is not recurrent. Relevant past surgeries include none. Treatment so far includes: dry dressings. The patient has not undergone any diagnostic testing as yet. Review of Systems   Constitutional:  Negative for chills and fever. Cardiovascular:  Positive for leg swelling. Skin:  Positive for color change and wound (right shin and blister at right medial ankle). Hematological:  Bruises/bleeds easily (on Coumadin). All other systems reviewed and are negative. Allergies   Allergen Reactions    Adhesive Tape      rash         Prior to Visit Medications    Medication Sig Taking?  Authorizing Provider   hydroCHLOROthiazide (HYDRODIURIL) 25 MG tablet Take 25 mg by mouth daily Yes Historical Provider, MD   potassium chloride (KLOR-CON M) 20 MEQ extended release tablet Take 20 mEq by mouth 2 times daily Yes Historical Provider, MD   furosemide (LASIX) 20 MG tablet Take 20 mg by mouth daily Yes Historical Provider, MD   famotidine (PEPCID) 40 MG tablet Take 40 mg by mouth daily Yes Historical Provider, MD   warfarin (COUMADIN) 5 MG tablet Take 5 mg by mouth daily Indications: 6 days pwer week 2.5 1 day Yes Historical Provider, MD   metoprolol succinate (TOPROL XL) 50 MG extended release tablet Take 100 mg by mouth 2 times daily  Yes Historical Provider, MD   calcium carbonate-cholecalciferol 600-800 MG-UNIT TABS Take by mouth Yes Historical Provider, MD   Multiple Vitamins-Minerals (THERAPEUTIC MULTIVITAMIN-MINERALS) tablet Take 1 tablet by mouth daily Yes Historical Provider, MD   Fexofenadine HCl (ALLEGRA ALLERGY PO) Take by mouth Yes Historical Provider, MD   silver sulfADIAZINE (SILVADENE) 1 % cream Apply topically to right shin wound twice weekly. Patient not taking: Reported on 11/17/2022  OZZIE Larson - CNP   betamethasone valerate (VALISONE) 0.1 % cream Apply topically 2 times daily Apply topically 2 times daily. Patient not taking: Reported on 11/17/2022  Historical Provider, MD       History reviewed. Objective:   Physical Exam  Vitals and nursing note reviewed. Constitutional:       Appearance: Normal appearance. HENT:      Head: Normocephalic and atraumatic. Nose: Nose normal.      Mouth/Throat:      Pharynx: Oropharynx is clear. Eyes:      Conjunctiva/sclera: Conjunctivae normal.   Cardiovascular:      Pulses:           Dorsalis pedis pulses are detected w/ Doppler on the right side and detected w/ Doppler on the left side. Posterior tibial pulses are 1+ on the right side and detected w/ Doppler on the left side. Comments:   11/17/2022  Right DP: triphasic  Right PT: +1 and biphasic     Left DP: triphasic  Left PT: biphasic   Pulmonary:      Effort: Pulmonary effort is normal.   Musculoskeletal:      Cervical back: Normal range of motion. Right lower leg: Edema (Right ankle measures 24.6 cm, right calf 33 cm) present. Left lower leg: Edema (Left ankle 24.3 cm, left calf 37.8 cm) present. Skin:     General: Skin is warm and dry. Capillary Refill: Capillary refill takes less than 2 seconds. Comments:   Hemosiderin discoloration bilaterally    Right shin ulcer measures 2.2 cm x 4.1 cm x 0.1 cm    Blister 3.9 cm x 1.9 cm with open area measuring 0.7 cm x 0.7 cm x 0.1 cm   Neurological:      General: No focal deficit present.       Mental Status: She is alert and oriented to person, place, and time. Cranial Nerves: No cranial nerve deficit. Sensory: No sensory deficit. Motor: No weakness. Coordination: Coordination normal.   Psychiatric:         Mood and Affect: Mood normal.         Behavior: Behavior normal.         Thought Content: Thought content normal.         Judgment: Judgment normal.       Assessment:      Diagnosis   1. Open wound of right lower leg, initial encounter    2. Leg swelling        Using curette, scissors and forceps, debrided epidermis and dermis of wound, located right shin. Topical Lidocaine 2% was indicated. Dimensions of wound are 2.2 cm x 4.1 cm. Dressed area with Cutimed Sorbact, silver alginate, gauze and Unna boot. No immediate complications observed. Call office if signs of infection or fever, excessive swelling or pain occur. PATIENT EDUCATION focused on need for Unna boot therapy. Unna boot is a gauze dressing consisting of zinc or calamine paste. It supports vascular problems, helps with healing leg ulcers, swelling or lymphedema by providing compression to the lower legs. The dressing can be worn up to a week before it needs changed. Patient must keep the Unna boot dry. Plan:       Return in 4 days (on 11/21/2022) for RLE ulcer/leg swelling and to have Unna boot changed.

## 2022-11-21 ENCOUNTER — OFFICE VISIT (OUTPATIENT)
Dept: VASCULAR SURGERY | Age: 82
End: 2022-11-21
Payer: MEDICARE

## 2022-11-21 VITALS
DIASTOLIC BLOOD PRESSURE: 84 MMHG | WEIGHT: 152 LBS | HEART RATE: 83 BPM | BODY MASS INDEX: 29.69 KG/M2 | SYSTOLIC BLOOD PRESSURE: 128 MMHG

## 2022-11-21 DIAGNOSIS — M79.89 LEG SWELLING: ICD-10-CM

## 2022-11-21 DIAGNOSIS — S81.801D OPEN WOUND OF RIGHT LOWER LEG, SUBSEQUENT ENCOUNTER: Primary | ICD-10-CM

## 2022-11-21 DIAGNOSIS — L03.116 CELLULITIS OF LEFT ANTERIOR LOWER LEG: ICD-10-CM

## 2022-11-21 PROCEDURE — 29580 STRAPPING UNNA BOOT: CPT | Performed by: NURSE PRACTITIONER

## 2022-11-21 RX ORDER — CEPHALEXIN 500 MG/1
500 CAPSULE ORAL 3 TIMES DAILY
Qty: 21 CAPSULE | Refills: 0 | Status: SHIPPED | OUTPATIENT
Start: 2022-11-21

## 2022-11-21 NOTE — PROGRESS NOTES
Subjective:      Patient ID: Ana Maria Contreras is a 78 y.o. female. Chief Complaint   Patient presents with    Wound Check    Leg Swelling     RLE ulcer/leg swelling and to have Unna boot changed     Pain Assessment  Harleen Hughes has a pain level on 0/10 scale:  7  Location:  Left lower leg  Description:  tender  Radiation:   No  Duration:  2-3 days  Time:  intermittent          Wound Check  The ulcer is located in the right shin. The symptoms first began days ago. The patient is currently not experiencing any pain. The patient reports associated symptoms of  drainage and leg swelling. The ulcer is aggravated by local pressure and prolonged sitting. Per the patient the ulcer is not recurrent. Relevant past surgeries include none. Treatment so far includes: Cutimed Sorbact, silver alginate, gauze and Unna boot. The patient has not undergone any diagnostic testing as yet. Review of Systems   Constitutional:  Negative for chills and fever. Cardiovascular:  Positive for leg swelling. Skin:  Positive for color change (Left leg > right) and wound (right shin). Hematological:  Bruises/bleeds easily (on Coumadin). All other systems reviewed and are negative. Allergies   Allergen Reactions    Adhesive Tape      rash         Prior to Visit Medications    Medication Sig Taking? Authorizing Provider   cephALEXin (KEFLEX) 500 MG capsule Take 1 capsule by mouth 3 times daily Yes Radene Alpha, APRN - CNP   silver sulfADIAZINE (SILVADENE) 1 % cream Apply topically to right shin wound twice weekly.  Yes Radene Alpha, APRN - CNP   hydroCHLOROthiazide (HYDRODIURIL) 25 MG tablet Take 25 mg by mouth daily Yes Historical Provider, MD   potassium chloride (KLOR-CON M) 20 MEQ extended release tablet Take 20 mEq by mouth 2 times daily Yes Historical Provider, MD   furosemide (LASIX) 20 MG tablet Take 20 mg by mouth daily Yes Historical Provider, MD   famotidine (PEPCID) 40 MG tablet Take 40 mg by mouth daily Yes Historical Provider, MD   warfarin (COUMADIN) 5 MG tablet Take 5 mg by mouth daily Indications: 6 days pwer week 2.5 1 day Yes Historical Provider, MD   metoprolol succinate (TOPROL XL) 50 MG extended release tablet Take 100 mg by mouth 2 times daily  Yes Historical Provider, MD   calcium carbonate-cholecalciferol 600-800 MG-UNIT TABS Take by mouth Yes Historical Provider, MD   Multiple Vitamins-Minerals (THERAPEUTIC MULTIVITAMIN-MINERALS) tablet Take 1 tablet by mouth daily Yes Historical Provider, MD   Fexofenadine HCl (ALLEGRA ALLERGY PO) Take by mouth Yes Historical Provider, MD   betamethasone valerate (VALISONE) 0.1 % cream Apply topically 2 times daily Apply topically 2 times daily. Yes Historical Provider, MD       History reviewed. Objective:   Physical Exam  Vitals and nursing note reviewed. Constitutional:       Appearance: Normal appearance. HENT:      Head: Normocephalic and atraumatic. Nose: Nose normal.      Mouth/Throat:      Pharynx: Oropharynx is clear. Eyes:      Conjunctiva/sclera: Conjunctivae normal.   Cardiovascular:      Pulses:           Dorsalis pedis pulses are detected w/ Doppler on the right side and detected w/ Doppler on the left side. Posterior tibial pulses are 1+ on the right side and detected w/ Doppler on the left side. Comments:   11/17/2022  Right DP: triphasic  Right PT: +1 and biphasic     Left DP: triphasic  Left PT: biphasic   Pulmonary:      Effort: Pulmonary effort is normal.   Musculoskeletal:      Cervical back: Normal range of motion. Right lower leg: Edema (Right ankle measures 22.6 cm, right calf 33 cm) present. Left lower leg: Edema (Left ankle 24.1 cm, left calf 37.8 cm) present. Skin:     General: Skin is warm and dry. Capillary Refill: Capillary refill takes less than 2 seconds. Findings: Erythema (left lower anterior leg is erythematous with mild warmth; edematous with no open wounds) present. Comments:   Right shin ulcer measures:  2.1 cm x 3.4 cm x 0.2 cm    Right ankle ulcer measures:   0.8 cm x 0.7 cm x 0.1 cm    Hemosiderin discoloration bilaterally   Neurological:      General: No focal deficit present. Mental Status: She is alert and oriented to person, place, and time. Cranial Nerves: No cranial nerve deficit. Sensory: No sensory deficit. Motor: No weakness. Coordination: Coordination normal.   Psychiatric:         Mood and Affect: Mood normal.         Behavior: Behavior normal.         Thought Content: Thought content normal.         Judgment: Judgment normal.       Assessment:      Diagnosis Orders   1. Open wound of right lower leg, subsequent encounter  WA APPLY OF PASTE BOOT   2. Cellulitis of left anterior lower leg  cephALEXin (KEFLEX) 500 MG capsule   3. Leg swelling  WA APPLY OF PASTE BOOT       Unna boot applied to RLE (Silvadene Cream to ulcers)      PATIENT EDUCATION focused on need for Unna boot therapy. Unna boot is a gauze dressing consisting of zinc or calamine paste. It supports vascular problems, helps with healing leg ulcers, swelling or lymphedema by providing compression to the lower legs. The dressing can be worn up to a week before it needs changed. Patient must keep the Unna boot dry.          Plan:         Return in about 1 week (around 11/28/2022) for FU bilateral LE's

## 2022-11-21 NOTE — PATIENT INSTRUCTIONS
Return in about 1 week (around 11/28/2022 at 4:15pm) for unna boot and leg swelling     PATIENT EDUCATION focused on need for continued Unna boot therapy for compression. It supports vascular problems, helps to heal leg ulcers and controls leg swelling. The dressing can be worn up to a week before it needs changed. Patient must keep the Unna boot dry.

## 2022-11-27 ASSESSMENT — ENCOUNTER SYMPTOMS: COLOR CHANGE: 1

## 2022-11-28 ENCOUNTER — OFFICE VISIT (OUTPATIENT)
Dept: VASCULAR SURGERY | Age: 82
End: 2022-11-28

## 2022-11-28 VITALS — DIASTOLIC BLOOD PRESSURE: 85 MMHG | HEART RATE: 95 BPM | SYSTOLIC BLOOD PRESSURE: 136 MMHG

## 2022-11-28 DIAGNOSIS — S81.801D OPEN WOUND OF RIGHT LOWER LEG, SUBSEQUENT ENCOUNTER: Primary | ICD-10-CM

## 2022-11-28 DIAGNOSIS — L03.116 CELLULITIS OF LEFT ANTERIOR LOWER LEG: ICD-10-CM

## 2022-11-28 DIAGNOSIS — M79.89 LEG SWELLING: ICD-10-CM

## 2022-11-28 NOTE — PATIENT INSTRUCTIONS
Please place gauze, silver alginate and more gauze over area that was weeping, followed by cast padding and ACE wrap. I am including supplies. I will see her back in our office on Thursday and hopefully will be able to apply an Unna boot. Return in 3 days (on 12/1/2022) for Unna boot application on LLE.

## 2022-11-29 ASSESSMENT — ENCOUNTER SYMPTOMS: COLOR CHANGE: 1

## 2022-11-29 NOTE — PROGRESS NOTES
Subjective:      Patient ID: Ivette Rodgers is a 78 y.o. female. Chief Complaint   Patient presents with    Wound Check - right shin    Cellulitis - LLE    Leg Swelling - bilateral L>R     Follow-up on bilateral lower extremities       Pain Assessment  Laura Hughes has a pain level on 0/10 scale:  5  Location:  Lower legs  Description:  tender  Radiation:   No  Duration:  1 week  Time:  intermittent      Wound Check    The ulcers are located in the right shin. The symptoms first began days ago. The patient is currently experiencing pain that she rates as a 5 on a scale of 1-10. The patient reports associated symptoms of RLE tenderness and wound drainage. Her LLE has exquisite tenderness, significant leg swelling with 3 small areas on her left shin and lateral calf that have pinpoint openings with weeping. She is just completing a course of Keflex for LLE cellulitis. The erythema has improved significantly since her last visit. Relevant past surgeries include none. Treatment to the right shin ulcer includes: Cutimed Sorbact, silver alginate, gauze and CoFlex Unna boot. Treatment for her left lower leg includes Keflex and leg elevation over the weekend. I was unable to apply an Unna boot d/t the significant erythema with warmth, but that has improved significantly. Review of Systems   Constitutional:  Negative for chills and fever. Cardiovascular:  Positive for leg swelling (bilateral; L>R). Skin:  Positive for color change (bilateral shins) and wound (right shin). Hematological:  Bruises/bleeds easily (on Coumadin). All other systems reviewed and are negative. Allergies   Allergen Reactions    Adhesive Tape      rash         Prior to Visit Medications    Medication Sig Taking?  Authorizing Provider   cephALEXin (KEFLEX) 500 MG capsule Take 1 capsule by mouth 3 times daily  Emory Schirmer, APRN - CNP   silver sulfADIAZINE (SILVADENE) 1 % cream Apply topically to right shin wound twice weekly. [facility], APRN - CNP   hydroCHLOROthiazide (HYDRODIURIL) 25 MG tablet Take 25 mg by mouth daily  Historical Provider, MD   potassium chloride (KLOR-CON M) 20 MEQ extended release tablet Take 20 mEq by mouth 2 times daily  Historical Provider, MD   furosemide (LASIX) 20 MG tablet Take 20 mg by mouth daily  Historical Provider, MD   famotidine (PEPCID) 40 MG tablet Take 40 mg by mouth daily  Historical Provider, MD   warfarin (COUMADIN) 5 MG tablet Take 5 mg by mouth daily Indications: 6 days pwer week 2.5 1 day  Historical Provider, MD   metoprolol succinate (TOPROL XL) 50 MG extended release tablet Take 100 mg by mouth 2 times daily   Historical Provider, MD   calcium carbonate-cholecalciferol 600-800 MG-UNIT TABS Take by mouth  Historical Provider, MD   Multiple Vitamins-Minerals (THERAPEUTIC MULTIVITAMIN-MINERALS) tablet Take 1 tablet by mouth daily  Historical Provider, MD   Fexofenadine HCl (ALLEGRA ALLERGY PO) Take by mouth  Historical Provider, MD   betamethasone valerate (VALISONE) 0.1 % cream Apply topically 2 times daily Apply topically 2 times daily. Historical Provider, MD       History reviewed. Objective:   Physical Exam  Vitals and nursing note reviewed. Constitutional:       Appearance: Normal appearance. HENT:      Head: Normocephalic and atraumatic. Nose: Nose normal.      Mouth/Throat:      Pharynx: Oropharynx is clear. Eyes:      Conjunctiva/sclera: Conjunctivae normal.   Cardiovascular:      Pulses:           Dorsalis pedis pulses are detected w/ Doppler on the right side and detected w/ Doppler on the left side. Posterior tibial pulses are 1+ on the right side and detected w/ Doppler on the left side.       Comments:   MEASUREMENTS 11/28/22:  Right ankle:  22 cm  Right calf:  32.3 cm    Left ankle:  23.2 cm  Left calf:  40.3 cm      11/17/2022  Right DP: triphasic  Right PT: +1 and biphasic     Left DP: triphasic  Left PT: biphasic   Pulmonary: Effort: Pulmonary effort is normal.   Musculoskeletal:      Cervical back: Normal range of motion. Right lower leg: Edema (Right ankle measures 22 cm, right calf 32.3 cm) present. Left lower leg: Edema (+3 pitting edema; Left ankle measures 23.2 cm, left calf 40.3 cm) present. Skin:     General: Skin is warm and dry. Capillary Refill: Capillary refill takes less than 2 seconds. Findings: Erythema (left lower leg erythema has improved significantly with use of Keflex) present. Comments:   Right shin ulcers measure:  1.7 cm x 3.2 cm x 0.2 cm  1 cm x 0.4 cm x 0.1 cm    Left distal shin has 2 pinpoint openings that are weeping  Left lateral calf has 1 pinpoint opening that is weeping    Hemosiderin discoloration bilaterally   Neurological:      General: No focal deficit present. Mental Status: She is alert and oriented to person, place, and time. Cranial Nerves: No cranial nerve deficit. Sensory: No sensory deficit. Motor: No weakness. Coordination: Coordination normal.   Psychiatric:         Mood and Affect: Mood normal.         Behavior: Behavior normal.         Thought Content: Thought content normal.         Judgment: Judgment normal.       Assessment:      Diagnosis   1. Open wound of right lower leg, subsequent encounter    2. Cellulitis of left anterior lower leg - resolving with use of Keflex   3. Leg swelling      Using curette, debrided skin and subcutaneous tissue (epidermis and dermis), first 20 sq cm or less, located right shin. Topical Lidocaine 2% was indicated. Dimensions of wound are 1.7 cm x 3.2 cm (5.44 sq cm). Dressed with Silvadene Cream, gauze dressing and CoFlex Unna boot. No immediate complications observed. Call office if signs of infection or fever, excessive swelling or pain occur. Left lower leg is weeping.   A layer of gauze was applied first (so the alginate doesn't stick since her skin is so fragile), silver alginate and another layer of gauze, followed by cotton cast padding. Then I applied an ACE wrap to her LLE. The first velcro strip was cut off of the ACE wrap since it is against her fragile skin. Plan is to have the dressing/ACE wrap on her left leg changed daily until her FU visit here on Thursday. If the weeping stops, I can apply an Unna boot to that leg, too. She has an office visit with cardiology tomorrow, her PCP on Wednesday and with me on Thursday. I gave her supplies for dressing changes for the next 2 days. PATIENT EDUCATION focused on need for Unna boot therapy. Unna boot is a gauze dressing consisting of zinc or calamine paste. It supports vascular problems, helps with healing leg ulcers, swelling or lymphedema by providing compression to the lower legs. The dressing can be worn up to a week before it needs changed. Patient must keep the Unna boot dry. Plan:       Return in 3 days (on 12/1/2022) for Unna boot application on LLE.

## 2022-12-01 ENCOUNTER — PROCEDURE VISIT (OUTPATIENT)
Dept: SURGERY | Age: 82
End: 2022-12-01

## 2022-12-01 ENCOUNTER — OFFICE VISIT (OUTPATIENT)
Dept: VASCULAR SURGERY | Age: 82
End: 2022-12-01

## 2022-12-01 VITALS — DIASTOLIC BLOOD PRESSURE: 70 MMHG | SYSTOLIC BLOOD PRESSURE: 126 MMHG

## 2022-12-01 DIAGNOSIS — M79.89 PAIN AND SWELLING OF LEFT LOWER LEG: Primary | ICD-10-CM

## 2022-12-01 DIAGNOSIS — L03.116 CELLULITIS OF LEFT ANTERIOR LOWER LEG: ICD-10-CM

## 2022-12-01 DIAGNOSIS — M79.662 PAIN AND SWELLING OF LEFT LOWER LEG: Primary | ICD-10-CM

## 2022-12-01 DIAGNOSIS — M79.605 PAIN OF LEFT LOWER EXTREMITY: ICD-10-CM

## 2022-12-01 DIAGNOSIS — M79.89 LEG SWELLING: Primary | ICD-10-CM

## 2022-12-01 ASSESSMENT — ENCOUNTER SYMPTOMS: COLOR CHANGE: 1

## 2022-12-01 NOTE — PROGRESS NOTES
Subjective:      Patient ID: Leyla Smith is a 78 y.o. female. Chief Complaint   Patient presents with    Leg Swelling     Pain and swelling in LLE         Pain Assessment  Zafar Hughes has a pain level on 0/10 scale:  5  Location:  Lower legs  Description:  tender  Radiation:   No  Duration:  1 week  Time:  intermittent      Left leg pain and swelling  Her LLE has exquisite tenderness and significant leg swelling with 1 small areas on her left lateral calf that has a pinpoint opening with weeping. She just completed a course of Keflex for LLE cellulitis; the erythema improved significantly with use of the antibiotic. She was seen by her PCP yesterday who started another course of Keflex 500 mg PO TID. The patient reports that her left leg was red since her last visit here and is questioning whether or not she could be sensitive to the cast padding;  I will avoid using that. She reports that her ankle/foot have been so swollen and tender that she has not felt comfortable walking. Review of Systems   Constitutional:  Negative for chills and fever. Cardiovascular:  Positive for leg swelling (bilateral; L>R). Skin:  Positive for color change (bilateral shins) and wound (right shin). Hematological:  Bruises/bleeds easily (on Coumadin). All other systems reviewed and are negative. Allergies   Allergen Reactions    Adhesive Tape      rash         Prior to Visit Medications    Medication Sig Taking? Authorizing Provider   cephALEXin (KEFLEX) 500 MG capsule Take 1 capsule by mouth 3 times daily Yes Darryle Guess, APRN - CNP   silver sulfADIAZINE (SILVADENE) 1 % cream Apply topically to right shin wound twice weekly.  Yes Darryle Guess, OZZIE Moe CNP   hydroCHLOROthiazide (HYDRODIURIL) 25 MG tablet Take 25 mg by mouth daily Yes Historical Provider, MD   potassium chloride (KLOR-CON M) 20 MEQ extended release tablet Take 20 mEq by mouth 2 times daily Yes Historical Provider, MD   furosemide (LASIX) 20 MG tablet Take 20 mg by mouth daily Yes Historical Provider, MD   famotidine (PEPCID) 40 MG tablet Take 40 mg by mouth daily Yes Historical Provider, MD   warfarin (COUMADIN) 5 MG tablet Take 5 mg by mouth daily Indications: 6 days pwer week 2.5 1 day Yes Historical Provider, MD   metoprolol succinate (TOPROL XL) 50 MG extended release tablet Take 100 mg by mouth 2 times daily  Yes Historical Provider, MD   calcium carbonate-cholecalciferol 600-800 MG-UNIT TABS Take by mouth Yes Historical Provider, MD   Multiple Vitamins-Minerals (THERAPEUTIC MULTIVITAMIN-MINERALS) tablet Take 1 tablet by mouth daily Yes Historical Provider, MD   Fexofenadine HCl (ALLEGRA ALLERGY PO) Take by mouth Yes Historical Provider, MD   betamethasone valerate (VALISONE) 0.1 % cream Apply topically 2 times daily Apply topically 2 times daily. Yes Historical Provider, MD       History reviewed. Objective:   Physical Exam  Vitals and nursing note reviewed. Constitutional:       Appearance: Normal appearance. HENT:      Head: Normocephalic and atraumatic. Nose: Nose normal.      Mouth/Throat:      Pharynx: Oropharynx is clear. Eyes:      Conjunctiva/sclera: Conjunctivae normal.   Cardiovascular:      Pulses:           Dorsalis pedis pulses are detected w/ Doppler on the right side and detected w/ Doppler on the left side. Posterior tibial pulses are 1+ on the right side and detected w/ Doppler on the left side. Comments:   MEASUREMENTS 11/28/22:  Right ankle:  22 cm  Right calf:  32.3 cm    Left ankle:  23.2 cm  Left calf:  40.3 cm      11/17/2022  Right DP: triphasic  Right PT: +1 and biphasic     Left DP: triphasic  Left PT: biphasic   Pulmonary:      Effort: Pulmonary effort is normal.   Musculoskeletal:      Cervical back: Normal range of motion. Right lower leg: Edema (controlled with Unna boot) present. Left lower leg: Left lower leg edema: +4 pitting edema, to include toes.    Skin: General: Skin is warm and dry. Capillary Refill: Capillary refill takes less than 2 seconds. Findings: Erythema (left lower leg erythema has improved significantly with use of Keflex) present. Comments:   11/28/22  Right shin ulcers measured:  1.7 cm x 3.2 cm x 0.2 cm  1 cm x 0.4 cm x 0.1 cm  Unna boot remains in place    Left lateral calf has 1 pinpoint opening that is weeping    Hemosiderin discoloration bilaterally   Neurological:      General: No focal deficit present. Mental Status: She is alert and oriented to person, place, and time. Cranial Nerves: No cranial nerve deficit. Sensory: No sensory deficit. Motor: No weakness. Coordination: Coordination normal.   Psychiatric:         Mood and Affect: Mood normal.         Behavior: Behavior normal.         Thought Content: Thought content normal.         Judgment: Judgment normal.       Assessment:      Diagnosis   1. Pain and swelling of left lower leg    2. Cellulitis of left anterior lower leg        Left lower leg is weeping from a 2mm opening in her left lateral calf. A layer of gauze was applied first (so the alginate doesn't stick to her fragile skin), silver alginate and another layer of gauze, followed by an abd pad and a Coflex Unna boot. Patient was concerned about it being too tight. I applied it so it could gently compress. Will have her back tomorrow to check her leg and change the [employer] If I can reduce the amount of swelling in her left lower leg, she should be able to walk without the pain. A venous scan was done to R/O DVT even though she is on Coumadin. It was negative for DVT, but shows pulsatile venous flow in the left common femoral vein and distal left leg veins are enlarged. This is consistent with venous hypertension possibly from a central venous source. Unna boot applied to LLE    PATIENT EDUCATION focused on need for [employer] therapy.   Unna boot is a gauze dressing consisting of zinc or calamine paste. It supports vascular problems, helps with healing leg ulcers, swelling or lymphedema by providing compression to the lower legs. The dressing can be worn up to a week before it needs changed. Patient must keep the Unna boot dry. Plan:       Return in about 1 day (around 12/2/2022) for left lower leg pain and swelling.

## 2022-12-01 NOTE — PATIENT INSTRUCTIONS
Return tomorrow, Friday, December 2, 2022 at 3:15 pm for [employer] change. PATIENT EDUCATION focused on need for Unna boot therapy for compression. It supports vascular problems, helps to heal leg ulcers and controls leg swelling. The dressing can be worn up to a week before it needs changed. Patient must keep the Unna boot dry.

## 2022-12-02 ENCOUNTER — OFFICE VISIT (OUTPATIENT)
Dept: VASCULAR SURGERY | Age: 82
End: 2022-12-02
Payer: MEDICARE

## 2022-12-02 ENCOUNTER — HOSPITAL ENCOUNTER (OUTPATIENT)
Dept: GENERAL RADIOLOGY | Age: 82
Discharge: HOME | End: 2022-12-02
Payer: MEDICARE

## 2022-12-02 ENCOUNTER — HOSPITAL ENCOUNTER (OUTPATIENT)
Age: 82
Discharge: HOME | End: 2022-12-02
Payer: MEDICARE

## 2022-12-02 VITALS
BODY MASS INDEX: 29.69 KG/M2 | DIASTOLIC BLOOD PRESSURE: 81 MMHG | HEART RATE: 100 BPM | WEIGHT: 152 LBS | SYSTOLIC BLOOD PRESSURE: 116 MMHG

## 2022-12-02 DIAGNOSIS — M79.89 PAIN AND SWELLING OF LEFT LOWER LEG: ICD-10-CM

## 2022-12-02 DIAGNOSIS — M79.662 PAIN AND SWELLING OF LEFT LOWER LEG: ICD-10-CM

## 2022-12-02 DIAGNOSIS — M25.572 ACUTE LEFT ANKLE PAIN: Primary | ICD-10-CM

## 2022-12-02 DIAGNOSIS — M25.572 ACUTE LEFT ANKLE PAIN: ICD-10-CM

## 2022-12-02 DIAGNOSIS — M79.89 LEG SWELLING: ICD-10-CM

## 2022-12-02 DIAGNOSIS — S81.801D OPEN WOUND OF RIGHT LOWER LEG, SUBSEQUENT ENCOUNTER: ICD-10-CM

## 2022-12-02 DIAGNOSIS — M79.672 LEFT FOOT PAIN: ICD-10-CM

## 2022-12-02 PROCEDURE — 73630 X-RAY EXAM OF FOOT: CPT

## 2022-12-02 PROCEDURE — 73610 X-RAY EXAM OF ANKLE: CPT

## 2022-12-02 PROCEDURE — 29580 STRAPPING UNNA BOOT: CPT | Performed by: NURSE PRACTITIONER

## 2022-12-02 ASSESSMENT — ENCOUNTER SYMPTOMS: COLOR CHANGE: 1

## 2022-12-02 NOTE — PROGRESS NOTES
Subjective:      Patient ID: Misha Casas is a 78 y.o. female. Chief Complaint   Patient presents with    Ankle Pain, Left    Joint Swelling, Left ankle    Wound Check, Right shin     Patient is here for FU. She has a wound on her right shin and has had significant swelling and pain in her LLE. Pain Assessment  Misha Casas has a pain level on 0/10 scale:  9  Location:  Left foot/ankle   Description:  sharp  Radiation:   No  Duration:  1 week  Time:  only with weight bearing       Left leg pain and swelling  Her LLE has had significant leg swelling with an area on her left lateral calf that has been weeping. Initially she had erythema with warmth and was treated with a course of Keflex. The erythema improved significantly with use of the antibiotic. She was seen by her PCP who started another course of Keflex 500 mg PO TID. Yesterday I put her leg in a wrap for compression and she is here today to see how it is doing. The leg swelling has improved significantly overnight and the weeping has stopped. She has marked edema in her ankle and foot; she has not been able to bear any weight on that leg. Plan for an X-ray of left ankle/foot. She is on coumadin; elevated PT/INR. She is under the care of Dr. Krishan Nixon. Review of Systems   Constitutional:  Negative for chills and fever. Cardiovascular:  Positive for leg swelling (significant edema in left ankle/foot; bilateral leg swelling controlled). Skin:  Positive for color change (bilateral shins) and wound (right shin). Hematological:  Bruises/bleeds easily (on Coumadin). All other systems reviewed and are negative. Allergies   Allergen Reactions    Adhesive Tape      rash         Prior to Visit Medications    Medication Sig Taking?  Authorizing Provider   cephALEXin (KEFLEX) 500 MG capsule Take 1 capsule by mouth 3 times daily  Alok Lipoma, APRN - CNP   silver sulfADIAZINE (SILVADENE) 1 % cream Apply topically to right shin wound twice weekly. Grady Halsted, APRN - CNP   hydroCHLOROthiazide (HYDRODIURIL) 25 MG tablet Take 25 mg by mouth daily  Historical Provider, MD   potassium chloride (KLOR-CON M) 20 MEQ extended release tablet Take 20 mEq by mouth 2 times daily  Historical Provider, MD   furosemide (LASIX) 20 MG tablet Take 20 mg by mouth daily  Historical Provider, MD   famotidine (PEPCID) 40 MG tablet Take 40 mg by mouth daily  Historical Provider, MD   warfarin (COUMADIN) 5 MG tablet Take 5 mg by mouth daily Indications: 6 days pwer week 2.5 1 day  Historical Provider, MD   metoprolol succinate (TOPROL XL) 50 MG extended release tablet Take 100 mg by mouth 2 times daily   Historical Provider, MD   calcium carbonate-cholecalciferol 600-800 MG-UNIT TABS Take by mouth  Historical Provider, MD   Multiple Vitamins-Minerals (THERAPEUTIC MULTIVITAMIN-MINERALS) tablet Take 1 tablet by mouth daily  Historical Provider, MD   Fexofenadine HCl (ALLEGRA ALLERGY PO) Take by mouth  Historical Provider, MD   betamethasone valerate (VALISONE) 0.1 % cream Apply topically 2 times daily Apply topically 2 times daily. Historical Provider, MD       History reviewed. Objective:   Physical Exam  Vitals and nursing note reviewed. Constitutional:       Appearance: Normal appearance. HENT:      Head: Normocephalic and atraumatic. Nose: Nose normal.      Mouth/Throat:      Pharynx: Oropharynx is clear. Eyes:      Conjunctiva/sclera: Conjunctivae normal.   Cardiovascular:      Pulses:           Dorsalis pedis pulses are detected w/ Doppler on the right side and detected w/ Doppler on the left side. Posterior tibial pulses are 1+ on the right side and detected w/ Doppler on the left side.       Comments:   MEASUREMENTS 12/02/22:  Right ankle:  22.1 cm  Right calf:  31.9 cm    Left ankle:  23.2 cm  Left calf:  39.7 cm    MEASUREMENTS 11/28/22:  Right ankle:  22 cm  Right calf:  32.3 cm    Left ankle:  23.2 cm  Left calf:  40.3 cm      11/17/2022  Right DP: triphasic  Right PT: +1 and biphasic     Left DP: triphasic  Left PT: biphasic   Pulmonary:      Effort: Pulmonary effort is normal.   Musculoskeletal:      Cervical back: Normal range of motion. Right lower leg: Edema (Right ankle 22.1cm, calf 31.9cm) present. Left lower leg: Edema (left ankle 23.2 cm, calf 39.7 cm) present. Skin:     General: Skin is warm and dry. Capillary Refill: Capillary refill takes less than 2 seconds. Findings: Erythema (left lower leg erythema has improved significantly with use of Keflex) present. Comments:   12-2-2022  Right shin ulcers measured:  1.4 cm x 3 cm  1 cm x 0.5 cm    Left lateral calf had 1 pinpoint opening that was weeping yesterday; it appears to have stopped    Hemosiderin discoloration bilaterally   Neurological:      General: No focal deficit present. Mental Status: She is alert and oriented to person, place, and time. Cranial Nerves: No cranial nerve deficit. Sensory: No sensory deficit. Motor: No weakness. Coordination: Coordination normal.   Psychiatric:         Mood and Affect: Mood normal.         Behavior: Behavior normal.         Thought Content: Thought content normal.         Judgment: Judgment normal.       Assessment:      Diagnosis   1. Acute left ankle pain - X-ray   2. Left foot pain - X-ray   3. Pain and swelling of left lower leg - X-ray   4. Open wound of right lower leg, subsequent encounter    5. Leg swelling        Right shin wound cleansed and dressed with Silvadene Cream, gauze dressing. Unna boots applied to BLE       Plan:       X-rays ordered of left foot/ankle. Called patient 12/3/22 at 9:00 a.m. She was able to make an appointment with Dr. Pedro Be, [employer], at her [facility] on Wednesday 12/7/2022 at 10 a.m. X-ray reports forwarded to Dr. Arlyne Rinne. Patient instructed to stay off of her left leg as she has been doing.   Keep left leg elevated as much as possible to control swelling.

## 2022-12-08 ENCOUNTER — OFFICE VISIT (OUTPATIENT)
Dept: VASCULAR SURGERY | Age: 82
End: 2022-12-08
Payer: MEDICARE

## 2022-12-08 VITALS — DIASTOLIC BLOOD PRESSURE: 84 MMHG | HEIGHT: 60 IN | BODY MASS INDEX: 29.69 KG/M2 | SYSTOLIC BLOOD PRESSURE: 120 MMHG

## 2022-12-08 DIAGNOSIS — S81.801D OPEN WOUND OF RIGHT LOWER LEG, SUBSEQUENT ENCOUNTER: Primary | ICD-10-CM

## 2022-12-08 DIAGNOSIS — M79.89 LEG SWELLING: ICD-10-CM

## 2022-12-08 PROCEDURE — 29580 STRAPPING UNNA BOOT: CPT | Performed by: NURSE PRACTITIONER

## 2022-12-08 ASSESSMENT — ENCOUNTER SYMPTOMS: COLOR CHANGE: 1

## 2022-12-08 NOTE — PATIENT INSTRUCTIONS
Return Wednesday, December 14 at 1:45 for wound check and unna boot change      PATIENT EDUCATION focused on need for continued Unna boot therapy for compression. They support vascular problems, help to heal leg ulcers and control leg swelling. The dressings can be worn up to a week before they need changed. Patient must keep the Unna boots dry.

## 2022-12-09 NOTE — PROGRESS NOTES
Subjective:      Patient ID: Ana Maria Contreras is a 78 y.o. female. Chief Complaint   Patient presents with    Ankle Pain     Left ankle pain     Wound Check     Right shin    Leg Swelling     Bilateral; here for bilateral Unna boots         Pain Assessment  Harleen Hughes has a pain level on 0/10 scale:  4  Location:  Left foot/ankle   Description:  sharp  Radiation:   No  Duration:  1 week  Time:  only with weight bearing       Left leg pain and swelling  Her LLE had significant leg swelling with an area on her left lateral calf that had been weeping. Initially she had erythema with warmth and was treated with a course of Keflex. The erythema improved significantly with use of the antibiotic. She was seen by her PCP who started another course of Keflex 500 mg PO TID. When the weeping stopped, I wrapped her leg in compression wraps. The leg swelling was controlled, but the swelling was more pronounced in her left ankle/foot. She was sent to an X-ray on 12/2/22 and I recommended she FU with Orthopedics. She made an appointment with Dr. Herminia Francisco. A steroid injection was given during that appointment to help with inflammation. She is here today for FU of her right shin wounds and to have bilateral compression wraps applied to control the lower extremity edema. Review of Systems   Constitutional:  Negative for chills and fever. Cardiovascular:  Positive for leg swelling (significant edema in left ankle/foot; bilateral leg swelling controlled). Skin:  Positive for color change (bilateral shins) and wound (right shin). Hematological:  Bruises/bleeds easily (on Coumadin). All other systems reviewed and are negative. Allergies   Allergen Reactions    Adhesive Tape      rash         Prior to Visit Medications    Medication Sig Taking?  Authorizing Provider   cephALEXin (KEFLEX) 500 MG capsule Take 1 capsule by mouth 3 times daily Yes Radene Alpha, APRN - CNP   silver sulfADIAZINE (SILVADENE) 1 % cream Apply topically to right shin wound twice weekly. Yes OZZIE Warren - MANUEL   hydroCHLOROthiazide (HYDRODIURIL) 25 MG tablet Take 25 mg by mouth daily Yes Historical Provider, MD   potassium chloride (KLOR-CON M) 20 MEQ extended release tablet Take 20 mEq by mouth 2 times daily Yes Historical Provider, MD   furosemide (LASIX) 20 MG tablet Take 20 mg by mouth daily Yes Historical Provider, MD   famotidine (PEPCID) 40 MG tablet Take 40 mg by mouth daily Yes Historical Provider, MD   warfarin (COUMADIN) 5 MG tablet Take 5 mg by mouth daily Indications: 6 days pwer week 2.5 1 day Yes Historical Provider, MD   metoprolol succinate (TOPROL XL) 50 MG extended release tablet Take 100 mg by mouth 2 times daily  Yes Historical Provider, MD   calcium carbonate-cholecalciferol 600-800 MG-UNIT TABS Take by mouth Yes Historical Provider, MD   Multiple Vitamins-Minerals (THERAPEUTIC MULTIVITAMIN-MINERALS) tablet Take 1 tablet by mouth daily Yes Historical Provider, MD   Fexofenadine HCl (ALLEGRA ALLERGY PO) Take by mouth Yes Historical Provider, MD   betamethasone valerate (VALISONE) 0.1 % cream Apply topically 2 times daily Apply topically 2 times daily. Yes Historical Provider, MD       History reviewed. Objective:   Physical Exam  Vitals and nursing note reviewed. Constitutional:       Appearance: Normal appearance. HENT:      Head: Normocephalic and atraumatic. Nose: Nose normal.      Mouth/Throat:      Pharynx: Oropharynx is clear. Eyes:      Conjunctiva/sclera: Conjunctivae normal.   Cardiovascular:      Pulses:           Dorsalis pedis pulses are detected w/ Doppler on the right side and detected w/ Doppler on the left side. Posterior tibial pulses are 1+ on the right side and detected w/ Doppler on the left side.       Comments:   MEASUREMENTS 12/08/22:  Right ankle:  22.5 cm  Right calf:  32.1 cm    Left ankle:  24.2 cm  Left calf:  37.8 cm    MEASUREMENTS 12/02/22:  Right ankle:  22.1 cm  Right calf:  31.9 cm    Left ankle:  23.2 cm  Left calf:  39.7 cm    MEASUREMENTS 11/28/22:  Right ankle:  22 cm  Right calf:  32.3 cm    Left ankle:  23.2 cm  Left calf:  40.3 cm      11/17/2022  Right DP: triphasic  Right PT: +1 and biphasic     Left DP: triphasic  Left PT: biphasic   Pulmonary:      Effort: Pulmonary effort is normal.   Musculoskeletal:      Cervical back: Normal range of motion. Right lower leg: Edema (Right ankle 22.5 cm, calf 32.1 cm) present. Left lower leg: Edema (left ankle 24.2 cm, calf 37.8 cm) present. Skin:     General: Skin is warm and dry. Capillary Refill: Capillary refill takes less than 2 seconds. Findings: Erythema (left lower leg erythema has improved significantly with use of Keflex) present. Comments:   12-8-2022  Right shin ulcers measure:  0.4 cm x 1.2 cm x 0.1 cm  0.2 cm x 0.1 cm x 0.1 cm    Left lateral calf open area appears healed    Hemosiderin discoloration bilaterally   Neurological:      General: No focal deficit present. Mental Status: She is alert and oriented to person, place, and time. Cranial Nerves: No cranial nerve deficit. Sensory: No sensory deficit. Motor: No weakness. Coordination: Coordination normal.   Psychiatric:         Mood and Affect: Mood normal.         Behavior: Behavior normal.         Thought Content: Thought content normal.         Judgment: Judgment normal.       Assessment:      Diagnosis   1. Open wound of right lower leg, subsequent encounter    2. Leg swelling      Unna boots applied to BLE (right shin wound cleansed and dressed with Silvadene Cream, gauze dressing)      PATIENT EDUCATION focused on need for continued Unna boot therapy for compression. They support vascular problems, help to heal leg ulcers and control leg swelling. The dressings can be worn up to a week before they need changed. Patient must keep the Unna boots dry.          Plan:       Return in about 6 days (around 12/14/2022) for wound check and unna boot change.

## 2022-12-14 ENCOUNTER — OFFICE VISIT (OUTPATIENT)
Dept: VASCULAR SURGERY | Age: 82
End: 2022-12-14
Payer: MEDICARE

## 2022-12-14 VITALS — SYSTOLIC BLOOD PRESSURE: 129 MMHG | DIASTOLIC BLOOD PRESSURE: 89 MMHG | HEIGHT: 60 IN | BODY MASS INDEX: 29.69 KG/M2

## 2022-12-14 DIAGNOSIS — M79.89 LEG SWELLING: ICD-10-CM

## 2022-12-14 DIAGNOSIS — S81.801D OPEN WOUND OF RIGHT LOWER LEG, SUBSEQUENT ENCOUNTER: Primary | ICD-10-CM

## 2022-12-14 PROCEDURE — 29580 STRAPPING UNNA BOOT: CPT | Performed by: NURSE PRACTITIONER

## 2022-12-14 ASSESSMENT — ENCOUNTER SYMPTOMS: COLOR CHANGE: 1

## 2022-12-14 NOTE — PATIENT INSTRUCTIONS
Return in about 1 week (around 12/21/2022) for RLE wounds/bilateral leg swelling and to have Unna boots changed. PATIENT EDUCATION focused on need for continued Unna boot therapy for compression. They support vascular problems, help to heal leg ulcers and control leg swelling. The dressings can be worn up to a week before they need changed. Patient must keep the Unna boots dry. [email or web address]

## 2022-12-15 NOTE — PROGRESS NOTES
Subjective:      Patient ID: Ricco Gill is a 78 y.o. female. Chief Complaint   Patient presents with    Wound Check    Leg Swelling     FU on right shin ulcers and bilateral LE edema. Patient is here for bilateral Unna boot change         Pain Assessment  Rich Hughes has a pain level on 0/10 scale:  3  Location:  Left foot/ankle   Description:  occasional sharp pains  Radiation:   No  Duration:  1 week  Time:  only with weight bearing       Left leg pain and swelling  Her LLE had significant leg swelling with an area on her left lateral calf that had been weeping. Initially she had erythema with warmth and was treated with a course of Keflex. The erythema improved significantly with use of the antibiotic. She was seen by her PCP who started another course of Keflex 500 mg PO TID. When the weeping stopped, I wrapped her leg in compression wraps. The leg swelling was controlled, but the swelling was more pronounced in her left ankle/foot. She was sent to an X-ray on 12/2/22 and I recommended she FU with Orthopedics. She made an appointment with Dr. Michelle Benson. A steroid injection was given during that appointment to help with inflammation. She is here today for FU of her right shin wounds and to have bilateral compression wraps re-applied to control the lower extremity edema. Review of Systems   Constitutional:  Negative for chills and fever. Cardiovascular:  Leg swelling: significant edema in left ankle/foot; bilateral leg swelling controlled with Unna boot. Skin:  Positive for color change (bilateral shins) and wound (right shin). Hematological:  Bruises/bleeds easily (on Coumadin). All other systems reviewed and are negative. Allergies   Allergen Reactions    Adhesive Tape      rash         Prior to Visit Medications    Medication Sig Taking?  Authorizing Provider   cephALEXin (KEFLEX) 500 MG capsule Take 1 capsule by mouth 3 times daily  Horacio Kiran, APRN - CNP silver sulfADIAZINE (SILVADENE) 1 % cream Apply topically to right shin wound twice weekly. Sidra Bahena, APRN - CNP   hydroCHLOROthiazide (HYDRODIURIL) 25 MG tablet Take 25 mg by mouth daily  Historical Provider, MD   potassium chloride (KLOR-CON M) 20 MEQ extended release tablet Take 20 mEq by mouth 2 times daily  Historical Provider, MD   furosemide (LASIX) 20 MG tablet Take 20 mg by mouth daily  Historical Provider, MD   famotidine (PEPCID) 40 MG tablet Take 40 mg by mouth daily  Historical Provider, MD   warfarin (COUMADIN) 5 MG tablet Take 5 mg by mouth daily Indications: 6 days pwer week 2.5 1 day  Historical Provider, MD   metoprolol succinate (TOPROL XL) 50 MG extended release tablet Take 100 mg by mouth 2 times daily   Historical Provider, MD   calcium carbonate-cholecalciferol 600-800 MG-UNIT TABS Take by mouth  Historical Provider, MD   Multiple Vitamins-Minerals (THERAPEUTIC MULTIVITAMIN-MINERALS) tablet Take 1 tablet by mouth daily  Historical Provider, MD   Fexofenadine HCl (ALLEGRA ALLERGY PO) Take by mouth  Historical Provider, MD   betamethasone valerate (VALISONE) 0.1 % cream Apply topically 2 times daily Apply topically 2 times daily. Historical Provider, MD       History reviewed. Objective:   Physical Exam  Vitals and nursing note reviewed. Constitutional:       Appearance: Normal appearance. HENT:      Head: Normocephalic and atraumatic. Nose: Nose normal.      Mouth/Throat:      Pharynx: Oropharynx is clear. Eyes:      Conjunctiva/sclera: Conjunctivae normal.   Cardiovascular:      Pulses:           Dorsalis pedis pulses are detected w/ Doppler on the right side and detected w/ Doppler on the left side. Posterior tibial pulses are 1+ on the right side and detected w/ Doppler on the left side.       Comments:   MEASUREMENTS 12/08/22:  Right ankle:  22.5 cm  Right calf:  32.1 cm    Left ankle:  24.2 cm  Left calf:  37.8 cm    MEASUREMENTS 12/02/22:  Right ankle: 22.1 cm  Right calf:  31.9 cm    Left ankle:  23.2 cm  Left calf:  39.7 cm    MEASUREMENTS 11/28/22:  Right ankle:  22 cm  Right calf:  32.3 cm    Left ankle:  23.2 cm  Left calf:  40.3 cm      11/17/2022  Right DP: triphasic  Right PT: +1 and biphasic     Left DP: triphasic  Left PT: biphasic   Pulmonary:      Effort: Pulmonary effort is normal.   Musculoskeletal:      Cervical back: Normal range of motion. Right lower leg: Edema (Nonpitting edema; right ankle 23 cm, calf 36.2 cm) present. Left lower leg: Edema (Nonpitting edema in leg; left ankle 22.7 cm, calf 3.5 cm) present. Skin:     General: Skin is warm and dry. Capillary Refill: Capillary refill takes less than 2 seconds. Comments:   12-8-2022  Right shin ulcers measure:  0.3 cm x 1.3 cm x 0.1 cm  0.5 cm x 0.5 cm x 0.1 cm    Left lateral calf open area appears healed    Hemosiderin discoloration bilaterally   Neurological:      General: No focal deficit present. Mental Status: She is alert and oriented to person, place, and time. Cranial Nerves: No cranial nerve deficit. Sensory: No sensory deficit. Motor: No weakness. Coordination: Coordination normal.   Psychiatric:         Mood and Affect: Mood normal.         Behavior: Behavior normal.         Thought Content: Thought content normal.         Judgment: Judgment normal.       Assessment:      Diagnosis   1. Open wound of right lower leg, subsequent encounter    2. Leg swelling      Debridement is not needed today. Unna boots applied to BLE (right shin wound cleansed and dressed with Silvadene Cream, gauze dressing)      PATIENT EDUCATION focused on need for continued Unna boot therapy for compression. They support vascular problems, help to heal leg ulcers and control leg swelling. The dressings can be worn up to a week before they need changed. Patient must keep the Unna boots dry.          Plan:       Return in about 1 week (around 12/21/2022) for RLE wounds/bilateral leg swelling and to have Unna boots changed.

## 2022-12-22 ENCOUNTER — OFFICE VISIT (OUTPATIENT)
Dept: VASCULAR SURGERY | Age: 82
End: 2022-12-22
Payer: MEDICARE

## 2022-12-22 VITALS — SYSTOLIC BLOOD PRESSURE: 99 MMHG | DIASTOLIC BLOOD PRESSURE: 82 MMHG

## 2022-12-22 DIAGNOSIS — M79.89 LEG SWELLING: ICD-10-CM

## 2022-12-22 DIAGNOSIS — S81.801D OPEN WOUND OF RIGHT LOWER LEG, SUBSEQUENT ENCOUNTER: Primary | ICD-10-CM

## 2022-12-22 PROCEDURE — 29580 STRAPPING UNNA BOOT: CPT | Performed by: NURSE PRACTITIONER

## 2022-12-22 ASSESSMENT — ENCOUNTER SYMPTOMS: COLOR CHANGE: 1

## 2022-12-22 NOTE — PROGRESS NOTES
Subjective:      Patient ID: Jl Quintana is a 78 y.o. female. No chief complaint on file. Pain Assessment  Jl Quintana has a pain level on 0/10 scale:  3  Location:  Left foot/ankle   Description:  occasional sharp pains  Radiation:   No  Duration:  1 week  Time:  only with weight bearing       Left leg pain and swelling  She has been seeing Dr. Sharath Lama, [facility] [address], for her LLE pain and swelling. She was instructed to start wearing a compression stocking, 20-30 mmHg, on that leg. The ulcer is located in the right shin. The symptoms first began weeks ago. The patient describes pain as a 3 on a scale of 1-10 in her left foot/ankle. The wound on her RLE is not painful. The patient reports associated symptoms of drainage of clear fluid and swelling. The ulcer is aggravated by local pressure and prolonged sitting. Per the patient the ulcer is recurrent. Relevant past surgeries include none. Treatment so far includes: debridement, medication (topical Cutimed Sorbact, silver alginate, gauze dressing), followed by an unna boot. The patient has not undergone any diagnostic testing as yet. Review of Systems   Constitutional:  Negative for chills and fever. Cardiovascular:  Leg swelling: significant edema in left ankle/foot; bilateral leg swelling controlled with Unna boot. Skin:  Positive for color change (bilateral shins) and wound (right shin). Hematological:  Bruises/bleeds easily (on Coumadin). All other systems reviewed and are negative. Allergies   Allergen Reactions    Adhesive Tape      rash         Prior to Visit Medications    Medication Sig Taking? Authorizing Provider   cephALEXin (KEFLEX) 500 MG capsule Take 1 capsule by mouth 3 times daily  OZZIE Matt CNP   silver sulfADIAZINE (SILVADENE) 1 % cream Apply topically to right shin wound twice weekly.   OZZIE Matt CNP   hydroCHLOROthiazide (HYDRODIURIL) 25 MG tablet Take 25 mg by mouth daily  Historical Provider, MD   potassium chloride (KLOR-CON M) 20 MEQ extended release tablet Take 20 mEq by mouth 2 times daily  Historical Provider, MD   furosemide (LASIX) 20 MG tablet Take 20 mg by mouth daily  Historical Provider, MD   famotidine (PEPCID) 40 MG tablet Take 40 mg by mouth daily  Historical Provider, MD   warfarin (COUMADIN) 5 MG tablet Take 5 mg by mouth daily Indications: 6 days pwer week 2.5 1 day  Historical Provider, MD   metoprolol succinate (TOPROL XL) 50 MG extended release tablet Take 100 mg by mouth 2 times daily   Historical Provider, MD   calcium carbonate-cholecalciferol 600-800 MG-UNIT TABS Take by mouth  Historical Provider, MD   Multiple Vitamins-Minerals (THERAPEUTIC MULTIVITAMIN-MINERALS) tablet Take 1 tablet by mouth daily  Historical Provider, MD   Fexofenadine HCl (ALLEGRA ALLERGY PO) Take by mouth  Historical Provider, MD   betamethasone valerate (VALISONE) 0.1 % cream Apply topically 2 times daily Apply topically 2 times daily. Historical Provider, MD       History reviewed. Objective:   Physical Exam  Vitals and nursing note reviewed. Constitutional:       Appearance: Normal appearance. HENT:      Head: Normocephalic and atraumatic. Nose: Nose normal.      Mouth/Throat:      Pharynx: Oropharynx is clear. Eyes:      Conjunctiva/sclera: Conjunctivae normal.   Cardiovascular:      Pulses:           Dorsalis pedis pulses are detected w/ Doppler on the right side and detected w/ Doppler on the left side. Posterior tibial pulses are 1+ on the right side and detected w/ Doppler on the left side. Comments:     MEASUREMENTS 12/22/22:  Right ankle:  22.3 cm  Right calf:  34.8cm    MEASUREMENTS 12/08/22:  Right ankle:  22.5 cm  Right calf:  32.1 cm    Left ankle:  24.2 cm  Left calf:  37.8 cm    Pulmonary:      Effort: Pulmonary effort is normal.   Musculoskeletal:      Cervical back: Normal range of motion.       Right lower leg: Edema (Nonpitting edema; right ankle 22.3 cm, calf 34.8 cm) present. Left lower leg: Edema (Nonpitting edema in leg; left ankle 22.7 cm, calf 3.5 cm) present. Skin:     General: Skin is warm and dry. Capillary Refill: Capillary refill takes less than 2 seconds. Comments:   12-  Right shin ulcer measures:  0.3 cm x 0.5 cm x 0.1 cm    3x3 Optifoam Border to the lateral 5th metatarsal to relieve pressure     Hemosiderin discoloration bilaterally   Neurological:      General: No focal deficit present. Mental Status: She is alert and oriented to person, place, and time. Cranial Nerves: No cranial nerve deficit. Sensory: No sensory deficit. Motor: No weakness. Coordination: Coordination normal.   Psychiatric:         Mood and Affect: Mood normal.         Behavior: Behavior normal.         Thought Content: Thought content normal.         Judgment: Judgment normal.       Assessment:      Diagnosis   1. Open wound of right lower leg, subsequent encounter    2. Leg swelling      Debridement is not needed today. Unna boot applied to RLE (right shin wound cleansed and dressed with Silvadene Cream, gauze dressing)    PATIENT EDUCATION focused on need for continued Unna boot therapy for compression. It supports vascular problems, helps to heal leg ulcers and controls leg swelling. The dressing can be worn up to a week before it needs changed. Patient must keep the Unna boot dry. Plan:       Return in about 13 days (around 1/4/2023) for RLE unna boot.

## 2022-12-22 NOTE — PATIENT INSTRUCTIONS
Return in about 13 days (around 1/4/2023) for RLE unna boot. PATIENT EDUCATION focused on need for continued Unna boot therapy for compression. It supports vascular problems, helps to heal leg ulcers and controls leg swelling. The dressing can be worn up to a week before it needs changed. Patient must keep the Unna boot dry. Instructed by Dr. Onesimo Blevins.

## 2023-01-04 ENCOUNTER — OFFICE VISIT (OUTPATIENT)
Dept: VASCULAR SURGERY | Age: 83
End: 2023-01-04
Payer: MEDICARE

## 2023-01-04 VITALS
BODY MASS INDEX: 29.69 KG/M2 | HEART RATE: 92 BPM | HEIGHT: 60 IN | DIASTOLIC BLOOD PRESSURE: 85 MMHG | SYSTOLIC BLOOD PRESSURE: 130 MMHG

## 2023-01-04 DIAGNOSIS — L81.8 HEMOSIDERIN PIGMENTATION OF SKIN: ICD-10-CM

## 2023-01-04 DIAGNOSIS — M79.89 LEG SWELLING: Primary | ICD-10-CM

## 2023-01-04 DIAGNOSIS — M79.605 BILATERAL LEG PAIN: ICD-10-CM

## 2023-01-04 DIAGNOSIS — M79.604 BILATERAL LEG PAIN: ICD-10-CM

## 2023-01-04 PROCEDURE — G8484 FLU IMMUNIZE NO ADMIN: HCPCS | Performed by: NURSE PRACTITIONER

## 2023-01-04 PROCEDURE — 1036F TOBACCO NON-USER: CPT | Performed by: NURSE PRACTITIONER

## 2023-01-04 PROCEDURE — G8427 DOCREV CUR MEDS BY ELIG CLIN: HCPCS | Performed by: NURSE PRACTITIONER

## 2023-01-04 PROCEDURE — 1123F ACP DISCUSS/DSCN MKR DOCD: CPT | Performed by: NURSE PRACTITIONER

## 2023-01-04 PROCEDURE — G8417 CALC BMI ABV UP PARAM F/U: HCPCS | Performed by: NURSE PRACTITIONER

## 2023-01-04 PROCEDURE — 99213 OFFICE O/P EST LOW 20 MIN: CPT | Performed by: NURSE PRACTITIONER

## 2023-01-04 PROCEDURE — 1090F PRES/ABSN URINE INCON ASSESS: CPT | Performed by: NURSE PRACTITIONER

## 2023-01-04 PROCEDURE — G8400 PT W/DXA NO RESULTS DOC: HCPCS | Performed by: NURSE PRACTITIONER

## 2023-01-05 ENCOUNTER — TELEPHONE (OUTPATIENT)
Dept: SURGERY | Age: 83
End: 2023-01-05

## 2023-01-05 PROBLEM — L81.8 HEMOSIDERIN PIGMENTATION OF SKIN: Status: ACTIVE | Noted: 2023-01-05

## 2023-01-05 ASSESSMENT — ENCOUNTER SYMPTOMS: COLOR CHANGE: 1

## 2023-01-05 NOTE — PATIENT INSTRUCTIONS
Return if symptoms worsen or fail to improve. PATIENT EDUCATION focused on the need for compression stockings on a daily basis, off every night. They are specially designed hosiery that improve blood flow in the legs, prevent blood clotting and inhibit the progression of a variety of venous disorders. They are designed to be tighter around the ankles with gradually less pressure moving up the lower limbs toward the knees. Working with the calf muscle, the stocking is designed to help squeeze the venous blood back up toward the heart, to enhance circulation.

## 2023-01-05 NOTE — PROGRESS NOTES
Subjective:      Patient ID: Jenny Ramirez is a 78 y.o. female. Chief Complaint   Patient presents with    Follow-up     Pt is here today for a followup to a right lower extremity skin ulcer. Pain Assessment  Jenny Ramirez has pain that she rates a 7-8 on a 1-10 scale whenever the skin on either leg is touched. Left leg pain and swelling  She has been seeing Dr. Miriam Burleson, On license of [facility], for her LLE pain and swelling. Dr. Luisa Robb prescribed a compression stocking, 20-30 mmHg, be worn on her left leg, but she reports that she is unable to tolerate the stocking. She does not have a fracture    The ulcer on her right shin has healed since her last visit. Review of Systems   Constitutional:  Negative for chills and fever. Cardiovascular:  Leg swelling: significant edema in left ankle/foot; bilateral leg swelling controlled with Unna boot. Skin:  Positive for color change (bilateral shins). Negative for wound (right shin wouind has healed). Hematological:  Bruises/bleeds easily (on Coumadin). All other systems reviewed and are negative. Allergies   Allergen Reactions    Adhesive Tape      rash         Prior to Visit Medications    Medication Sig Taking? Authorizing Provider   silver sulfADIAZINE (SILVADENE) 1 % cream Apply topically to right shin wound twice weekly.  Yes Tilmon Fruit, APRN - CNP   hydroCHLOROthiazide (HYDRODIURIL) 25 MG tablet Take 25 mg by mouth daily Yes Historical Provider, MD   potassium chloride (KLOR-CON M) 20 MEQ extended release tablet Take 20 mEq by mouth 2 times daily Yes Historical Provider, MD   furosemide (LASIX) 20 MG tablet Take 20 mg by mouth daily Yes Historical Provider, MD   famotidine (PEPCID) 40 MG tablet Take 40 mg by mouth daily Yes Historical Provider, MD   warfarin (COUMADIN) 5 MG tablet Take 5 mg by mouth daily Indications: 6 days pwer week 2.5 1 day Yes Historical Provider, MD   metoprolol succinate (TOPROL XL) 50 MG extended release tablet Take 100 mg by mouth 2 times daily  Yes Historical Provider, MD   calcium carbonate-cholecalciferol 600-800 MG-UNIT TABS Take by mouth Yes Historical Provider, MD   Multiple Vitamins-Minerals (THERAPEUTIC MULTIVITAMIN-MINERALS) tablet Take 1 tablet by mouth daily Yes Historical Provider, MD   Fexofenadine HCl (ALLEGRA ALLERGY PO) Take by mouth Yes Historical Provider, MD   betamethasone valerate (VALISONE) 0.1 % cream Apply topically 2 times daily Apply topically 2 times daily. Yes Historical Provider, MD       History reviewed. Objective:   Physical Exam  Vitals and nursing note reviewed. Constitutional:       Appearance: Normal appearance. HENT:      Head: Normocephalic and atraumatic. Nose: Nose normal.      Mouth/Throat:      Pharynx: Oropharynx is clear. Eyes:      Conjunctiva/sclera: Conjunctivae normal.   Cardiovascular:      Pulses:           Dorsalis pedis pulses are detected w/ Doppler on the right side and detected w/ Doppler on the left side. Posterior tibial pulses are 1+ on the right side and detected w/ Doppler on the left side. Comments:         MEASUREMENTS 1/04/23:  Right ankle:  22.4 cm  Right calf:  40 cm    Left ankle:  22.3 cm  Left calf:  41.8 cm    Circumference of feet  Right 21.6 cm  Left 25.1 cm    MEASUREMENTS 12/22/22:  Right ankle:  22.3 cm  Right calf:  34.8cm    MEASUREMENTS 12/08/22:  Right ankle:  22.5 cm  Right calf:  32.1 cm    Left ankle:  24.2 cm  Left calf:  37.8 cm    Pulmonary:      Effort: Pulmonary effort is normal.   Musculoskeletal:      Cervical back: Normal range of motion. Right lower leg: Edema (Nonpitting edema; right ankle 22.3 cm, calf 34.8 cm) present. Left lower leg: Edema (Nonpitting edema in leg; left ankle 22.7 cm, calf 3.5 cm) present. Skin:     General: Skin is warm and dry. Capillary Refill: Capillary refill takes less than 2 seconds.       Comments:   Right shin ulcer has healed     Hemosiderin discoloration bilaterally   Neurological:      General: No focal deficit present. Mental Status: She is alert and oriented to person, place, and time. Cranial Nerves: No cranial nerve deficit. Sensory: No sensory deficit. Motor: No weakness. Coordination: Coordination normal.   Psychiatric:         Mood and Affect: Mood normal.         Behavior: Behavior normal.         Thought Content: Thought content normal.         Judgment: Judgment normal.       Assessment:      Diagnosis   1. Leg swelling    2. Bilateral leg pain    3. Hemosiderin pigmentation of skin      Since she has no fracture on her LLE, recommended she try bearing weight on her left ankle this week. PATIENT EDUCATION focused on the need for compression stockings on a daily basis, off every night. They are specially designed hosiery that improve blood flow in the legs, prevent blood clotting and inhibit the progression of a variety of venous disorders. They are designed to be tighter around the ankles with gradually less pressure moving up the lower limbs toward the knees. Working with the calf muscle, the stocking is designed to help squeeze the venous blood back up toward the heart, to enhance circulation. Attempted to apply a Spandagrip to her RLE; patient was unable to tolerate it. She plans to see how her compression stockings fit when she gets home. I also showed her the velcro compression system, but she said that she would not be able to tolerate itlike Marielywiliam wants me to call her granddaughter, Lauren Portillo, with an update and to explain use of the stockings to her. I called and left a message. Plan:       Return if symptoms worsen or fail to improve.

## 2025-04-29 ENCOUNTER — OFFICE VISIT (OUTPATIENT)
Dept: VASCULAR SURGERY | Age: 85
End: 2025-04-29

## 2025-04-29 VITALS
HEART RATE: 81 BPM | WEIGHT: 154.4 LBS | SYSTOLIC BLOOD PRESSURE: 132 MMHG | DIASTOLIC BLOOD PRESSURE: 82 MMHG | BODY MASS INDEX: 30.15 KG/M2

## 2025-04-29 DIAGNOSIS — M79.89 LEG SWELLING: ICD-10-CM

## 2025-04-29 DIAGNOSIS — S81.801A OPEN WOUND OF RIGHT LOWER LEG, INITIAL ENCOUNTER: Primary | ICD-10-CM

## 2025-04-29 DIAGNOSIS — L81.8 HEMOSIDERIN PIGMENTATION OF SKIN: ICD-10-CM

## 2025-04-29 RX ORDER — METOPROLOL SUCCINATE 100 MG/1
100 TABLET, EXTENDED RELEASE ORAL 2 TIMES DAILY
COMMUNITY
Start: 2025-03-17

## 2025-04-29 RX ORDER — CYCLOSPORINE 0 G/ML
SOLUTION/ DROPS OPHTHALMIC; TOPICAL
COMMUNITY
Start: 2024-05-15

## 2025-04-29 NOTE — PATIENT INSTRUCTIONS
Return in one week for wound check and unna boot change.    PATIENT EDUCATION focused on need for Unna boot therapy for compression.  It supports vascular problems, helps to heal leg ulcers and controls leg swelling.  The dressing can be worn up to a week before it needs changed.  Patient must keep the Unna boot dry.

## 2025-04-29 NOTE — PROGRESS NOTES
Vika Hughes (:  1940) is a 84 y.o. female,Established patient, here for evaluation of the following chief complaint(s):  Wound Check (Recurrent right shin ulcer, multiple small blisters on left shin and leg swelling)      HPI    Wound Check  The ulcer is located in the right shin.  She also has multiple small blisters on her left shin.  The symptoms first began days ago.  The patient is currently experiencing mild tenderness.  The patient reports associated symptoms of leg swelling, R>L.  The ulcer is aggravated by local pressure and prolonged sitting.  Per the patient the ulcer and blisters are recurrent.  Relevant past surgeries include none.  Treatment so far includes: dry dressings.  The patient has not undergone any diagnostic testing as yet.    Review of Systems   Constitutional:  Negative for chills and fever.   Cardiovascular:  Positive for leg swelling (right lower leg).   Musculoskeletal:  Positive for arthralgias.   Skin:  Positive for color change (hemosiderin discoloration bilaterally) and wound (right shin, multiple small blisters on left shin).   Hematological:  Bruises/bleeds easily (on Eliquis).   All other systems reviewed and are negative.      Allergies   Allergen Reactions    Adhesive Tape      rash       Prior to Visit Medications    Medication Sig Taking? Authorizing Provider   apixaban (ELIQUIS) 5 MG TABS tablet Take 1 tablet by mouth 2 times daily Yes ProviderJes MD   cycloSPORINE, PF, (CEQUA) 0.09 % SOLN SMARTSI Drop(s) In Eye(s) Every 12 Hours Yes ProviderJes MD   metoprolol succinate (TOPROL XL) 100 MG extended release tablet Take 1 tablet by mouth 2 times daily Yes Jes Wisdom MD   silver sulfADIAZINE (SILVADENE) 1 % cream Apply topically to right shin wound twice weekly. Yes Marcelino Wiley APRN - CNP   hydroCHLOROthiazide (HYDRODIURIL) 25 MG tablet Take 1 tablet by mouth daily Yes Jes Wisdom MD   potassium chloride (KLOR-CON

## 2025-05-03 ASSESSMENT — ENCOUNTER SYMPTOMS: COLOR CHANGE: 1

## 2025-05-06 ENCOUNTER — OFFICE VISIT (OUTPATIENT)
Dept: VASCULAR SURGERY | Age: 85
End: 2025-05-06
Payer: MEDICARE

## 2025-05-06 VITALS — HEIGHT: 61 IN | BODY MASS INDEX: 29.23 KG/M2 | WEIGHT: 154.8 LBS

## 2025-05-06 DIAGNOSIS — M79.89 LEG SWELLING: ICD-10-CM

## 2025-05-06 DIAGNOSIS — S81.801D OPEN WOUND OF RIGHT LOWER LEG, SUBSEQUENT ENCOUNTER: Primary | ICD-10-CM

## 2025-05-06 DIAGNOSIS — L81.8 HEMOSIDERIN PIGMENTATION OF SKIN: ICD-10-CM

## 2025-05-06 PROCEDURE — 29580 STRAPPING UNNA BOOT: CPT | Performed by: NURSE PRACTITIONER

## 2025-05-06 RX ORDER — FUROSEMIDE 40 MG/1
TABLET ORAL
COMMUNITY
Start: 2025-03-07

## 2025-05-06 ASSESSMENT — ENCOUNTER SYMPTOMS: COLOR CHANGE: 1

## 2025-05-06 NOTE — PATIENT INSTRUCTIONS
Return in about 1 week (around 5/13/2025) for wound check and unna boot change.    PATIENT EDUCATION focused on need for continued Unna boot therapy for compression.  It supports vascular problems, helps to heal leg ulcers and controls leg swelling.  The dressing can be worn up to a week before it needs changed.  Patient must keep the Unna boot dry.

## 2025-05-06 NOTE — PROGRESS NOTES
Vika Hughes (:  1940) is a 84 y.o. female,Established patient, here for evaluation of the following chief complaint(s):  Wound Check and Leg Swelling (One week FU on RLE venous ulcer/leg swelling and to have Unna boot changed)      Wound Check  The ulcer is located in the right shin.  She also had multiple small blisters on her left shin.  The symptoms first began days ago.  The patient is currently experiencing mild tenderness.  The patient reports associated symptoms of leg swelling.  The ulcer is aggravated by local pressure and prolonged sitting.  Per the patient the ulcer and blisters are recurrent.  Relevant past surgeries include none.  Treatment so far includes: Silvadene Cream to ulcers, followed by abd pad and Unna boot on the RLE and dry dressing, elastic stocking and her compression stocking on LLE.  The patient has not undergone any diagnostic testing as yet.    Review of Systems   Constitutional:  Negative for chills and fever.   Cardiovascular:  Positive for leg swelling (bilateral; RLE controlled with Unna boot, LLE controlled with compression stocking).   Musculoskeletal:  Positive for arthralgias.   Skin:  Positive for color change (hemosiderin discoloration bilaterally) and wound (right shin).   Hematological:  Bruises/bleeds easily (on Eliquis).   All other systems reviewed and are negative.      Allergies   Allergen Reactions    Adhesive Tape      rash       Prior to Visit Medications    Medication Sig Taking? Authorizing Provider   furosemide (LASIX) 40 MG tablet TAKE 1 TABLET BY MOUTH DAILY. PATIENT TAKES 1 TABLET PER DAY SOMETIMES 1/2 Yes ProviderJes MD   apixaban (ELIQUIS) 5 MG TABS tablet Take 1 tablet by mouth 2 times daily Yes Provider, MD Jes   cycloSPORINE, PF, (CEQUA) 0.09 % SOLN SMARTSI Drop(s) In Eye(s) Every 12 Hours Yes Provider, MD Jes   metoprolol succinate (TOPROL XL) 100 MG extended release tablet Take 1 tablet by mouth 2 times daily

## 2025-05-13 ENCOUNTER — OFFICE VISIT (OUTPATIENT)
Dept: VASCULAR SURGERY | Age: 85
End: 2025-05-13

## 2025-05-13 VITALS
BODY MASS INDEX: 29.25 KG/M2 | SYSTOLIC BLOOD PRESSURE: 144 MMHG | HEIGHT: 61 IN | DIASTOLIC BLOOD PRESSURE: 82 MMHG | HEART RATE: 93 BPM

## 2025-05-13 DIAGNOSIS — L81.8 HEMOSIDERIN PIGMENTATION OF SKIN: ICD-10-CM

## 2025-05-13 DIAGNOSIS — S81.801D OPEN WOUND OF RIGHT LOWER LEG, SUBSEQUENT ENCOUNTER: Primary | ICD-10-CM

## 2025-05-13 DIAGNOSIS — M79.89 LEG SWELLING: ICD-10-CM

## 2025-05-13 ASSESSMENT — ENCOUNTER SYMPTOMS: COLOR CHANGE: 1

## 2025-05-13 NOTE — PATIENT INSTRUCTIONS
Return in about 1 week (around 5/20/2025) for wound check.    PATIENT EDUCATION focused on need for continued Unna boot therapy.  Unna boot is a gauze dressing consisting of zinc or calamine paste.  It supports vascular problems, helps with healing leg ulcers, swelling or lymphedema by providing compression to the lower legs.  The dressing can be worn up to a week before it needs changed.  Patient must keep the Unna boot dry.

## 2025-05-13 NOTE — PROGRESS NOTES
Vika Hughes (:  1940) is a 84 y.o. female,Established patient, here for evaluation of the following chief complaint(s):  Wound Check (Right shin wound check/)      Wound Check  The ulcer is located in the right shin.  She also had multiple small blisters on her left shin that have since resolved.  The symptoms first began days ago.  The patient is currently experiencing mild tenderness.  The patient reports associated symptoms of leg swelling.  The ulcer is aggravated by local pressure and prolonged sitting.  Per the patient the ulcer and blisters are recurrent.  Relevant past surgeries include none.  Current treatment includes: small amount of Silvadene Cream to ulcer, followed by Cutimed Sorbact X2 layers, gauze dressing and Unna boot on the RLE and dry dressing, elastic stocking and her compression stocking on LLE.  The patient has not undergone any diagnostic testing as yet.    Review of Systems   Constitutional:  Negative for chills and fever.   Cardiovascular:  Positive for leg swelling (bilateral; RLE controlled with Unna boot, LLE controlled with compression stocking).   Musculoskeletal:  Positive for arthralgias.   Skin:  Positive for color change (hemosiderin discoloration bilaterally) and wound (right shin).   Hematological:  Bruises/bleeds easily (on Eliquis).   All other systems reviewed and are negative.      Allergies   Allergen Reactions    Adhesive Tape      rash       Prior to Visit Medications    Medication Sig Taking? Authorizing Provider   furosemide (LASIX) 40 MG tablet TAKE 1 TABLET BY MOUTH DAILY. PATIENT TAKES 1 TABLET PER DAY SOMETIMES 1/2 Yes ProviderJes MD   apixaban (ELIQUIS) 5 MG TABS tablet Take 1 tablet by mouth 2 times daily Yes Provider, MD Jes   cycloSPORINE, PF, (CEQUA) 0.09 % SOLN SMARTSI Drop(s) In Eye(s) Every 12 Hours Yes Provider, MD Jes   metoprolol succinate (TOPROL XL) 100 MG extended release tablet Take 1 tablet by mouth 2 times

## 2025-05-20 ENCOUNTER — OFFICE VISIT (OUTPATIENT)
Dept: VASCULAR SURGERY | Age: 85
End: 2025-05-20
Payer: MEDICARE

## 2025-05-20 VITALS
SYSTOLIC BLOOD PRESSURE: 127 MMHG | BODY MASS INDEX: 29.25 KG/M2 | HEIGHT: 61 IN | HEART RATE: 68 BPM | DIASTOLIC BLOOD PRESSURE: 87 MMHG

## 2025-05-20 DIAGNOSIS — S81.801D OPEN WOUND OF RIGHT LOWER LEG, SUBSEQUENT ENCOUNTER: Primary | ICD-10-CM

## 2025-05-20 DIAGNOSIS — M79.89 LEG SWELLING: ICD-10-CM

## 2025-05-20 PROCEDURE — 29580 STRAPPING UNNA BOOT: CPT | Performed by: NURSE PRACTITIONER

## 2025-05-20 ASSESSMENT — ENCOUNTER SYMPTOMS: COLOR CHANGE: 1

## 2025-05-20 NOTE — PROGRESS NOTES
Vika Hughes (:  1940) is a 84 y.o. female,Established patient, here for evaluation of the following chief complaint(s):  Wound Check (Right leg wound, unna boot change)    Pain Assessment  The patient is currently not experiencing any pain at this time.    Wound Check  The ulcer is located in the right shin.  She also had multiple small blisters on her left shin that have since resolved.  The symptoms first began days ago.  The patient is currently not experiencing any pain.  The patient reports associated symptoms of leg swelling.  The ulcer is aggravated by local pressure and prolonged sitting.  Per the patient the ulcer and blisters are recurrent.  Relevant past surgeries include none.  Current treatment includes: small amount of Silvadene Cream to ulcer, followed by Cutimed Sorbact X2 layers, gauze dressing and Unna boot on the RLE and her compression stocking on LLE.  The patient has not undergone any diagnostic testing as yet.    Review of Systems   Constitutional:  Negative for chills and fever.   Cardiovascular:  Positive for leg swelling (bilateral; RLE controlled with Unna boot, LLE controlled with compression stocking).   Musculoskeletal:  Positive for arthralgias.   Skin:  Positive for color change (hemosiderin discoloration bilaterally) and wound (right shin).   Hematological:  Bruises/bleeds easily (on Eliquis).   All other systems reviewed and are negative.      Allergies   Allergen Reactions    Adhesive Tape      rash       Prior to Visit Medications    Medication Sig Taking? Authorizing Provider   furosemide (LASIX) 40 MG tablet TAKE 1 TABLET BY MOUTH DAILY. PATIENT TAKES 1 TABLET PER DAY SOMETIMES 1/2 Yes ProviderJes MD   apixaban (ELIQUIS) 5 MG TABS tablet Take 1 tablet by mouth 2 times daily Yes Provider, MD Jes   cycloSPORINE, PF, (CEQUA) 0.09 % SOLN SMARTSI Drop(s) In Eye(s) Every 12 Hours Yes Provider, MD Jes   metoprolol succinate (TOPROL XL) 100 MG

## 2025-05-20 NOTE — PATIENT INSTRUCTIONS
Return in about 8 days (around 5/28/2025) for wound check and unna boot change.    PATIENT EDUCATION focused on need for continued Unna boot therapy for compression.  It supports vascular problems, helps to heal leg ulcers and controls leg swelling.  The dressing can be worn up to a week before it needs changed.  Patient must keep the Unna boot dry.

## 2025-05-28 ENCOUNTER — OFFICE VISIT (OUTPATIENT)
Dept: VASCULAR SURGERY | Age: 85
End: 2025-05-28
Payer: MEDICARE

## 2025-05-28 VITALS — BODY MASS INDEX: 29.02 KG/M2 | SYSTOLIC BLOOD PRESSURE: 116 MMHG | WEIGHT: 153.6 LBS | DIASTOLIC BLOOD PRESSURE: 81 MMHG

## 2025-05-28 DIAGNOSIS — M79.89 LEG SWELLING: ICD-10-CM

## 2025-05-28 DIAGNOSIS — S81.801D OPEN WOUND OF RIGHT LOWER LEG, SUBSEQUENT ENCOUNTER: Primary | ICD-10-CM

## 2025-05-28 DIAGNOSIS — L81.8 HEMOSIDERIN PIGMENTATION OF SKIN: ICD-10-CM

## 2025-05-28 DIAGNOSIS — S80.829A BLISTER OF LEG: ICD-10-CM

## 2025-05-28 PROCEDURE — 29580 STRAPPING UNNA BOOT: CPT | Performed by: NURSE PRACTITIONER

## 2025-05-28 ASSESSMENT — ENCOUNTER SYMPTOMS: COLOR CHANGE: 1

## 2025-05-28 NOTE — PROGRESS NOTES
Vika Hughes (:  1940) is a 84 y.o. female,Established patient, here for evaluation of the following chief complaint(s):  Wound Check and Leg Swelling (One week FU on right shin wound, bilateral LE leg swelling )    Pain Assessment  The patient is currently not experiencing any pain at this time.    Wound Check  The ulcer is located in the right shin.  She also reports recurrent small blisters on her left shin The symptoms first began days ago.  The patient is currently not experiencing any pain.  The patient reports associated symptoms of bilateral leg swelling.  The ulcer is aggravated by local pressure and prolonged sitting.  Per the patient the ulcer and blisters are recurrent.  Relevant past surgeries include none.  Current treatment includes: small amount of Silvadene Cream to ulcer, followed by Cutimed Sorbact X2 layers, gauze dressing and Unna boot on the RLE and her compression stocking on LLE.  The patient has not undergone any diagnostic testing as yet.    Review of Systems   Constitutional:  Negative for chills and fever.   Cardiovascular:  Positive for leg swelling (bilateral; RLE controlled with Unna boot, LLE controlled with compression stocking).   Musculoskeletal:  Positive for arthralgias.   Skin:  Positive for color change (hemosiderin discoloration bilaterally) and wound (right shin; blisters left shin).   Hematological:  Bruises/bleeds easily (on Eliquis).   All other systems reviewed and are negative.      Allergies   Allergen Reactions    Adhesive Tape      rash       Prior to Visit Medications    Medication Sig Taking? Authorizing Provider   furosemide (LASIX) 40 MG tablet TAKE 1 TABLET BY MOUTH DAILY. PATIENT TAKES 1 TABLET PER DAY SOMETIMES 1/2 Yes Provider, MD Jes   apixaban (ELIQUIS) 5 MG TABS tablet Take 1 tablet by mouth 2 times daily Yes Provider, MD Jes   cycloSPORINE, PF, (CEQUA) 0.09 % SOLN SMARTSI Drop(s) In Eye(s) Every 12 Hours Yes Provider,

## 2025-05-28 NOTE — PATIENT INSTRUCTIONS
Return in about 1 week (around 6/4/2025) for bilateral Unna boots.      PATIENT EDUCATION focused on need for continued Unna boot therapy for compression.  They support vascular problems, help to heal leg ulcers and control leg swelling.  The dressings can be worn up to a week before they need changed.  Patient must keep the Unna boots dry.

## 2025-06-03 ENCOUNTER — OFFICE VISIT (OUTPATIENT)
Dept: VASCULAR SURGERY | Age: 85
End: 2025-06-03
Payer: MEDICARE

## 2025-06-03 VITALS
BODY MASS INDEX: 29.07 KG/M2 | HEIGHT: 61 IN | DIASTOLIC BLOOD PRESSURE: 74 MMHG | WEIGHT: 154 LBS | SYSTOLIC BLOOD PRESSURE: 132 MMHG | HEART RATE: 87 BPM

## 2025-06-03 DIAGNOSIS — I87.2 CHRONIC VENOUS INSUFFICIENCY: ICD-10-CM

## 2025-06-03 DIAGNOSIS — M79.89 LEG SWELLING: Primary | ICD-10-CM

## 2025-06-03 PROCEDURE — G8417 CALC BMI ABV UP PARAM F/U: HCPCS | Performed by: NURSE PRACTITIONER

## 2025-06-03 PROCEDURE — 1090F PRES/ABSN URINE INCON ASSESS: CPT | Performed by: NURSE PRACTITIONER

## 2025-06-03 PROCEDURE — 1123F ACP DISCUSS/DSCN MKR DOCD: CPT | Performed by: NURSE PRACTITIONER

## 2025-06-03 PROCEDURE — 1160F RVW MEDS BY RX/DR IN RCRD: CPT | Performed by: NURSE PRACTITIONER

## 2025-06-03 PROCEDURE — 1036F TOBACCO NON-USER: CPT | Performed by: NURSE PRACTITIONER

## 2025-06-03 PROCEDURE — G8427 DOCREV CUR MEDS BY ELIG CLIN: HCPCS | Performed by: NURSE PRACTITIONER

## 2025-06-03 PROCEDURE — 99213 OFFICE O/P EST LOW 20 MIN: CPT | Performed by: NURSE PRACTITIONER

## 2025-06-03 PROCEDURE — 1159F MED LIST DOCD IN RCRD: CPT | Performed by: NURSE PRACTITIONER

## 2025-06-03 ASSESSMENT — ENCOUNTER SYMPTOMS: COLOR CHANGE: 1
